# Patient Record
Sex: MALE | Race: WHITE | NOT HISPANIC OR LATINO | Employment: FULL TIME | URBAN - METROPOLITAN AREA
[De-identification: names, ages, dates, MRNs, and addresses within clinical notes are randomized per-mention and may not be internally consistent; named-entity substitution may affect disease eponyms.]

---

## 2018-11-15 ENCOUNTER — OFFICE VISIT (OUTPATIENT)
Dept: FAMILY MEDICINE | Facility: CLINIC | Age: 23
End: 2018-11-15
Payer: COMMERCIAL

## 2018-11-15 ENCOUNTER — LAB VISIT (OUTPATIENT)
Dept: LAB | Facility: HOSPITAL | Age: 23
End: 2018-11-15
Attending: FAMILY MEDICINE
Payer: COMMERCIAL

## 2018-11-15 VITALS
DIASTOLIC BLOOD PRESSURE: 60 MMHG | BODY MASS INDEX: 24.96 KG/M2 | SYSTOLIC BLOOD PRESSURE: 106 MMHG | HEART RATE: 81 BPM | TEMPERATURE: 98 F | HEIGHT: 72 IN | OXYGEN SATURATION: 98 % | WEIGHT: 184.31 LBS

## 2018-11-15 DIAGNOSIS — J01.00 ACUTE MAXILLARY SINUSITIS, RECURRENCE NOT SPECIFIED: ICD-10-CM

## 2018-11-15 DIAGNOSIS — Z00.00 VISIT FOR WELL MAN HEALTH CHECK: ICD-10-CM

## 2018-11-15 DIAGNOSIS — Z11.3 ROUTINE SCREENING FOR STI (SEXUALLY TRANSMITTED INFECTION): ICD-10-CM

## 2018-11-15 DIAGNOSIS — Z23 NEED FOR DIPHTHERIA-TETANUS-PERTUSSIS (TDAP) VACCINE: ICD-10-CM

## 2018-11-15 DIAGNOSIS — Z00.00 VISIT FOR WELL MAN HEALTH CHECK: Primary | ICD-10-CM

## 2018-11-15 DIAGNOSIS — Z91.09 ENVIRONMENTAL ALLERGIES: ICD-10-CM

## 2018-11-15 LAB
25(OH)D3+25(OH)D2 SERPL-MCNC: 34 NG/ML
ALBUMIN SERPL BCP-MCNC: 4.4 G/DL
ALP SERPL-CCNC: 69 U/L
ALT SERPL W/O P-5'-P-CCNC: 19 U/L
ANION GAP SERPL CALC-SCNC: 11 MMOL/L
AST SERPL-CCNC: 39 U/L
BASOPHILS # BLD AUTO: 0.02 K/UL
BASOPHILS NFR BLD: 0.3 %
BILIRUB SERPL-MCNC: 0.3 MG/DL
BUN SERPL-MCNC: 16 MG/DL
CALCIUM SERPL-MCNC: 9.8 MG/DL
CHLORIDE SERPL-SCNC: 105 MMOL/L
CHOLEST SERPL-MCNC: 171 MG/DL
CHOLEST/HDLC SERPL: 3.8 {RATIO}
CO2 SERPL-SCNC: 28 MMOL/L
CREAT SERPL-MCNC: 1.3 MG/DL
DIFFERENTIAL METHOD: NORMAL
EOSINOPHIL # BLD AUTO: 0.3 K/UL
EOSINOPHIL NFR BLD: 5.3 %
ERYTHROCYTE [DISTWIDTH] IN BLOOD BY AUTOMATED COUNT: 11.9 %
EST. GFR  (AFRICAN AMERICAN): >60 ML/MIN/1.73 M^2
EST. GFR  (NON AFRICAN AMERICAN): >60 ML/MIN/1.73 M^2
GLUCOSE SERPL-MCNC: 92 MG/DL
HCT VFR BLD AUTO: 44 %
HDLC SERPL-MCNC: 45 MG/DL
HDLC SERPL: 26.3 %
HGB BLD-MCNC: 15 G/DL
IMM GRANULOCYTES # BLD AUTO: 0.01 K/UL
IMM GRANULOCYTES NFR BLD AUTO: 0.2 %
LDLC SERPL CALC-MCNC: 95.8 MG/DL
LYMPHOCYTES # BLD AUTO: 2.1 K/UL
LYMPHOCYTES NFR BLD: 32.1 %
MCH RBC QN AUTO: 29.9 PG
MCHC RBC AUTO-ENTMCNC: 34.1 G/DL
MCV RBC AUTO: 88 FL
MONOCYTES # BLD AUTO: 0.6 K/UL
MONOCYTES NFR BLD: 9.3 %
NEUTROPHILS # BLD AUTO: 3.4 K/UL
NEUTROPHILS NFR BLD: 52.8 %
NONHDLC SERPL-MCNC: 126 MG/DL
NRBC BLD-RTO: 0 /100 WBC
PLATELET # BLD AUTO: 274 K/UL
PMV BLD AUTO: 10.8 FL
POTASSIUM SERPL-SCNC: 3.8 MMOL/L
PROT SERPL-MCNC: 8.4 G/DL
RBC # BLD AUTO: 5.02 M/UL
SODIUM SERPL-SCNC: 144 MMOL/L
TRIGL SERPL-MCNC: 151 MG/DL
WBC # BLD AUTO: 6.47 K/UL

## 2018-11-15 PROCEDURE — 85025 COMPLETE CBC W/AUTO DIFF WBC: CPT

## 2018-11-15 PROCEDURE — 82306 VITAMIN D 25 HYDROXY: CPT

## 2018-11-15 PROCEDURE — 86703 HIV-1/HIV-2 1 RESULT ANTBDY: CPT

## 2018-11-15 PROCEDURE — 36415 COLL VENOUS BLD VENIPUNCTURE: CPT | Mod: PO

## 2018-11-15 PROCEDURE — 99385 PREV VISIT NEW AGE 18-39: CPT | Mod: 25,S$GLB,, | Performed by: FAMILY MEDICINE

## 2018-11-15 PROCEDURE — 90471 IMMUNIZATION ADMIN: CPT | Mod: S$GLB,,, | Performed by: FAMILY MEDICINE

## 2018-11-15 PROCEDURE — 90715 TDAP VACCINE 7 YRS/> IM: CPT | Mod: S$GLB,,, | Performed by: FAMILY MEDICINE

## 2018-11-15 PROCEDURE — 80061 LIPID PANEL: CPT

## 2018-11-15 PROCEDURE — 99999 PR PBB SHADOW E&M-EST. PATIENT-LVL III: CPT | Mod: PBBFAC,,, | Performed by: FAMILY MEDICINE

## 2018-11-15 PROCEDURE — 80053 COMPREHEN METABOLIC PANEL: CPT

## 2018-11-15 RX ORDER — FLUTICASONE PROPIONATE 50 MCG
2 SPRAY, SUSPENSION (ML) NASAL DAILY
Qty: 1 BOTTLE | Refills: 11 | Status: SHIPPED | OUTPATIENT
Start: 2018-11-15

## 2018-11-15 RX ORDER — AZELASTINE 1 MG/ML
1 SPRAY, METERED NASAL 2 TIMES DAILY
Qty: 30 ML | Refills: 11 | Status: SHIPPED | OUTPATIENT
Start: 2018-11-15 | End: 2020-09-10

## 2018-11-15 RX ORDER — CETIRIZINE HYDROCHLORIDE 10 MG/1
10 TABLET ORAL DAILY
Qty: 90 TABLET | Refills: 5 | Status: SHIPPED | OUTPATIENT
Start: 2018-11-15 | End: 2020-09-10

## 2018-11-15 RX ORDER — AMOXICILLIN 500 MG/1
500 TABLET, FILM COATED ORAL EVERY 12 HOURS
Qty: 14 TABLET | Refills: 0 | Status: SHIPPED | OUTPATIENT
Start: 2018-11-15 | End: 2018-11-22

## 2018-11-15 NOTE — PROGRESS NOTES
Subjective:       Patient ID: Will Maldonado is a 23 y.o. male.    Chief Complaint: Annual Exam    Will Maldonado is a 23 y.o. male who presents today to establish care.     Diet/Exercise: he works out about 3 times per week. He lifts weight and does rare cardio. He eats mostly home cooked meals. He doesn't drink soda. Mostly water. 1 cup of black coffee daily. No sweet tea.     He has been having sinus issues for about 2 weeks. He is having sinus pressure and drainage. He also endorses post nasal drip. He has tried mucinex and that helps minimally. He has tried a neti pot and that has helped minimally.     Flu: UTD per EMR  Tdap: ordered  Labs: ordered    PMHx: reviewed in EMR and updated  Meds: reviewed in EMR and updated  Shx: reviewed in EMR and updated  FMHx:  reviewed in EMR and updated  Social: He works at [employer] as a . He just moved from [place], about two weeks ago. He is living with his parents in [place], where he is from [facility]. He was in [place] for just about 2 years. 1 cat at home. No smokers at home.       Review of Systems   Constitutional: Negative for chills and fever.   HENT: Positive for congestion, rhinorrhea, sinus pressure, sinus pain and sore throat.    Respiratory: Negative for cough and shortness of breath.    Cardiovascular: Negative for chest pain and palpitations.   Gastrointestinal: Negative for blood in stool, constipation, diarrhea, nausea and vomiting.   Genitourinary: Negative for difficulty urinating and dysuria.         Health Maintenance Due   Topic Date Due    TETANUS VACCINE  06/02/2016     Immunization History   Administered Date(s) Administered    DTaP 1994, 1994, 02/07/1995, 01/25/1996, 07/30/1998    HIB 1994, 1994, 02/07/1995, 11/03/1995    HPV Quadrivalent 08/09/2012, 10/19/2012, 03/15/2013    Hepatitis A, Pediatric/Adolescent, 2 Dose 08/07/2007, 08/05/2008    Hepatitis B, Pediatric/Adolescent 1994,  1994, 11/03/1995    IPV 1994, 1994, 02/07/1995, 07/30/1998    Influenza 10/25/2008, 10/08/2010, 10/18/2011, 10/19/2012, 11/15/2014    Influenza A (H1N1) 2009 Monovalent - IM 12/03/2009    Influenza Split 10/19/2012    MMR 11/03/1995, 07/30/1998    Meningococcal Conjugate 08/09/2012    Meningococcal Conjugate (MCV4P) 08/07/2007, 08/09/2012    Tdap 06/02/2006, 11/15/2018    Varicella 08/22/1995, 08/07/2007       Objective:     Vitals:    11/15/18 1521   BP: 106/60   Pulse: 81   Temp: 98.2 °F (36.8 °C)        Physical Exam   Constitutional: He is oriented to person, place, and time. He appears well-developed and well-nourished.   HENT:   Head: Normocephalic and atraumatic.   Right Ear: Tympanic membrane, external ear and ear canal normal.   Left Ear: Tympanic membrane, external ear and ear canal normal.   Mouth/Throat: Mucous membranes are normal. No tonsillar exudate.   Nasal congestion noted  Cobblestoning noted  Visible post nasal drip noted   Eyes: Conjunctivae are normal. Pupils are equal, round, and reactive to light.   Neck: Normal range of motion. Neck supple.   Cardiovascular: Normal rate, regular rhythm and normal heart sounds.   Pulmonary/Chest: Effort normal and breath sounds normal. No respiratory distress.   Abdominal: Soft. Bowel sounds are normal. He exhibits no distension. There is no tenderness.   Musculoskeletal: He exhibits no edema.   Neurological: He is alert and oriented to person, place, and time.   Skin: Skin is warm and dry.   Psychiatric: He has a normal mood and affect. His speech is normal and behavior is normal. Judgment and thought content normal.   Nursing note and vitals reviewed.      Assessment:       1. Visit for well man health check    2. Need for diphtheria-tetanus-pertussis (Tdap) vaccine    3. Environmental allergies    4. Acute maxillary sinusitis, recurrence not specified    5. Routine screening for STI (sexually transmitted infection)    6. BMI  25.0-25.9,adult        Plan:         Visit for well man health check  ?Avoid tobacco  ?Be physically active  ?Maintain a healthy weight  ?Eat a diet rich in fruits, vegetables, and whole grains, and low in saturated/trans fat  ?Limit alcohol consumption  ?Protect against sexually transmitted infections  ?Avoid excess sun  -     CBC auto differential; Future; Expected date: 11/15/2018  -     Comprehensive metabolic panel; Future; Expected date: 11/15/2018  -     Lipid panel; Future; Expected date: 11/15/2018  -     Vitamin D; Future; Expected date: 11/15/2018  -     Tdap Vaccine  -     HIV-1 and HIV-2 antibodies; Future; Expected date: 11/15/2018    Need for diphtheria-tetanus-pertussis (Tdap) vaccine  -     Tdap Vaccine    Environmental allergies/Acute maxillary sinusitis, recurrence not specified  -     cetirizine (ZYRTEC) 10 MG tablet; Take 1 tablet (10 mg total) by mouth once daily.  Dispense: 90 tablet; Refill: 5  -     fluticasone (FLONASE) 50 mcg/actuation nasal spray; 2 sprays (100 mcg total) by Each Nare route once daily.  Dispense: 1 Bottle; Refill: 11  -     azelastine (ASTELIN) 137 mcg (0.1 %) nasal spray; 1 spray (137 mcg total) by Nasal route 2 (two) times daily.  Dispense: 30 mL; Refill: 11  -     amoxicillin (AMOXIL) 500 MG Tab; Take 1 tablet (500 mg total) by mouth every 12 (twelve) hours. for 7 days  Dispense: 14 tablet; Refill: 0    Routine screening for STI (sexually transmitted infection)  -     HIV-1 and HIV-2 antibodies; Future; Expected date: 11/15/2018      Warning signs discussed, patient to call with any further issues or worsening of symptoms.

## 2018-11-15 NOTE — PATIENT INSTRUCTIONS
?Avoid tobacco  ?Be physically active  ?Maintain a healthy weight  ?Eat a diet rich in fruits, vegetables, and whole grains, and low in saturated/trans fat  ?Limit alcohol consumption  ?Protect against sexually transmitted infections  ?Avoid excess sun    Discussed diagnosis and treatment of sinusitis  Amoxicillin 500 mg BID x 7 days  Suggested brief course of Afrin for 3 days total (OTC)  Flonase BID  Nasal saline spray for congestion qhs  Aggressive fluids  Buckwheat honey for possible cough  Follow up if symptoms aren't resolving.      4 Steps for Eating Healthier  Changing the way you eat can improve your health. It can lower your cholesterol and blood pressure, and help you stay at a healthy weight. Your diet doesnt have to be bland and boring to be healthy. Just watch your calories and follow these steps:    1. Eat fewer unhealthy fats  · Choose more fish and lean meats instead of fatty cuts of meat.  · Skip butter and lard, and use less margarine.  · Pass on foods that have palm, coconut, or hydrogenated oils.  · Eat fewer high-fat dairy foods like cheese, ice cream, and whole milk.  · Get a heart-healthy cookbook and try some low-fat recipes.  2. Go light on salt  · Keep the saltshaker off the table.  · Limit high-salt ingredients, such as soy sauce, bouillon, and garlic salt.  · Instead of adding salt when cooking, season your food with herbs and flavorings. Try lemon, garlic, and onion.  · Limit convenience foods, such as boxed or canned foods and restaurant food.  · Read food labels and choose lower-sodium options.  3. Limit sugar  · Pause before you add sugars to pancakes, cereal, coffee, or tea. This includes white and brown table sugar, syrup, honey, and molasses. Cut your usual amount by half.  · Use non-sugar sweeteners. Stevia, aspartame, and sucralose can satisfy a sweet tooth without adding calories.  · Swap out sugar-filled soda and other drinks. Buy sugar-free or low-calorie beverages. Remember  water is always the best choice.  · Read labels and choose foods with less added sugar. Keep in mind that dairy foods and foods with fruit will have some natural sugar.  · Cut the sugar in recipes by 1/3 to 1/2. Boost the flavor with extracts like almond, vanilla, or orange. Or add spices such as cinnamon or nutmeg.  4. Eat more fiber  · Eat fresh fruits and vegetables every day.  · Boost your diet with whole grains. Go for oats, whole-grain rice, and bran.  · Add beans and lentils to your meals.  · Drink more water to match your fiber increase. This is to help prevent constipation.  Date Last Reviewed: 5/11/2015  © 4054-7452 [employer]. [address] [place] [address], [place] [address]. All rights reserved. This information is not intended as a substitute for professional medical care. Always follow your healthcare professional's instructions.        Understanding Fat and Cholesterol  Too much cholesterol in your blood can lead to many problems such as blocked arteries. This can lead to heart attack and stroke. One of the best ways to manage heart and blood vessel disease is to lower your blood cholesterol. Planning meals that are low in saturated fat and cholesterol helps reduce the level of cholesterol in your blood. Below are eating tips to help lower your blood cholesterol levels.  Eat less fat  A healthy goal is to have less than 25% to 35% of your daily calories come from fat. Instead of fats, eat more fruits, whole-grains, and vegetables. This also helps control your weight, and can even reduce your risk for some cancers. There are different kinds of fats in foods. Fats can be saturated, unsaturated, or trans fats. The best fats to choose are unsaturated fats. But fats are high in calories, so eat even unsaturated fats sparingly.  Limit foods high in saturated fats  Saturated fats come from animals and certain plants (such as coconut and palm). Eating too much saturated fat can raise your blood  cholesterol levels and make your artery problems worse. Your goal is to eat less saturated fat. Below are some examples of foods that contain lots of saturated fat:  · Fatty cuts of meat (lamb, ham, beef)  · Many pastries, cakes, cookies, and candies  · Cream, ice cream, sour cream, cheese, and butter, and foods made with them  · Sauces made with butter or cream  · Salad dressings with saturated fats  · Foods that contain palm or coconut oil  Choose unsaturated fats  Unsaturated fats are usually liquid at room temperature. They are better choices for your heart than saturated fat. There are two types of unsaturated fats: polyunsaturated fat and monounsaturated fat. Aim to replace saturated fats with polyunsaturated or monounsaturated fats.  · Polyunsaturated fats are found in corn oil, safflower oil, sunflower oil, and other vegetable oils.  · Monounsaturated fats are found in olive oil, canola oil, and peanut oil. Some margarines and spreads are now made with these oils, too. Avocados are also high in monounsaturated fat.  Of all fats, monounsaturated fats are the least harmful to your heart.  Avoid trans fats  Like saturated fats, trans fats have been linked to heart disease. Even a small amount can harm your health. Trans fats are found in liquid oils that have been changed to be solid at room temperature. Margarine, which is often made from vegetable oil, is one example. Vegetable shortening is another. Trans fats are often found in packaged goods. Check ingredients for the words hydrogenated or partially hydrogenated. They mean the foods contain trans fat.  What about triglycerides?  Triglycerides are a type of fat in your blood. Like cholesterol, high levels of triglycerides can lead to blocked arteries. High triglyceride levels can be reduced 20% to 50%  by limiting added sugars in your diet, susbstituting healthier fats for saturated and trans fats, getting more physical activity, and losing weight if  your are overweight. You may also be advised to avoid or limi alcohol.    Reading food labels  Luckily, most foods now have labels giving you the facts about what youre eating. Reading food labels helps you make healthy choices. Look for the words highlighted below.  · Serving Size. This is the amount of food in 1 serving. If you eat larger portions, be sure to count more of everything: fat, calories, and cholesterol.  · Total Fat. Tells you how many grams (g) of fat are in 1 serving.  · Calories from Fat. This tells you the total number of calories from fat in 1 serving (there are 9 calories per gram of fat). Look for foods with the fewest calories from fat.  · Saturated Fat. Tells you how many grams (g) of saturated fat are in 1 serving.  · Trans Fat. Tells how many grams (g) of trans fat are in 1 serving.  · Cholesterol. Tells you how many milligrams (mg) of cholesterol are in 1 serving.  Date Last Reviewed: 5/11/2015  © 7337-2424 [employer]. [address] [place] [address], [place] [address]. All rights reserved. This information is not intended as a substitute for professional medical care. Always follow your healthcare professional's instructions.

## 2018-11-16 LAB — HIV 1+2 AB+HIV1 P24 AG SERPL QL IA: NEGATIVE

## 2019-10-16 ENCOUNTER — OFFICE VISIT (OUTPATIENT)
Dept: GASTROENTEROLOGY | Facility: CLINIC | Age: 24
End: 2019-10-16
Payer: COMMERCIAL

## 2019-10-16 ENCOUNTER — TELEPHONE (OUTPATIENT)
Dept: GASTROENTEROLOGY | Facility: CLINIC | Age: 24
End: 2019-10-16

## 2019-10-16 VITALS
HEART RATE: 81 BPM | SYSTOLIC BLOOD PRESSURE: 115 MMHG | DIASTOLIC BLOOD PRESSURE: 72 MMHG | WEIGHT: 177.94 LBS | BODY MASS INDEX: 24.13 KG/M2

## 2019-10-16 DIAGNOSIS — K60.2 ANAL FISSURE: Primary | ICD-10-CM

## 2019-10-16 DIAGNOSIS — K92.1 HEMATOCHEZIA: ICD-10-CM

## 2019-10-16 PROCEDURE — 99999 PR PBB SHADOW E&M-EST. PATIENT-LVL III: ICD-10-PCS | Mod: PBBFAC,,, | Performed by: INTERNAL MEDICINE

## 2019-10-16 PROCEDURE — 3008F PR BODY MASS INDEX (BMI) DOCUMENTED: ICD-10-PCS | Mod: CPTII,S$GLB,, | Performed by: INTERNAL MEDICINE

## 2019-10-16 PROCEDURE — 99204 PR OFFICE/OUTPT VISIT, NEW, LEVL IV, 45-59 MIN: ICD-10-PCS | Mod: S$GLB,,, | Performed by: INTERNAL MEDICINE

## 2019-10-16 PROCEDURE — 99204 OFFICE O/P NEW MOD 45 MIN: CPT | Mod: S$GLB,,, | Performed by: INTERNAL MEDICINE

## 2019-10-16 PROCEDURE — 99999 PR PBB SHADOW E&M-EST. PATIENT-LVL III: CPT | Mod: PBBFAC,,, | Performed by: INTERNAL MEDICINE

## 2019-10-16 PROCEDURE — 3008F BODY MASS INDEX DOCD: CPT | Mod: CPTII,S$GLB,, | Performed by: INTERNAL MEDICINE

## 2019-10-16 NOTE — PROGRESS NOTES
Subjective:       Patient ID: Will Maldonado is a 24 y.o. male.    Chief Complaint: Rectal Bleeding    HPI  Review of Systems    Objective:      Physical Exam    Assessment:       No diagnosis found.    Plan:       ***

## 2019-10-28 NOTE — PROGRESS NOTES
Subjective:       Patient ID: Will Maldonado is a 24 y.o. male.    Chief Complaint: Rectal Bleeding    This is a 24-year-old male here for rectal bleeding and anorectal pain.  The symptoms have been present for weeks described as a stinging, sharp pain located in the perirectal region with bright red blood noted with bowel movements.  He does not have any symptoms in between bowel movements.  No other exacerbating or relieving factors or other associated symptoms.  He noted 1 episode of this remotely as well which resolved on its own.  No family history of colon cancer or polyps. No change in stool caliber, weight loss.     The following portions of the patient's history were reviewed and updated as appropriate: allergies, current medications, past family history, past medical history, past social history, past surgical history and problem list.    (Portions of this note were dictated using voice recognition software and may contain dictation related errors in spelling/grammar/syntax not found on text review)  HPI  Review of Systems   Constitutional: Negative for chills and fever.   HENT: Negative for postnasal drip and trouble swallowing.    Eyes: Negative for pain and visual disturbance.   Respiratory: Negative for cough, choking and shortness of breath.    Cardiovascular: Negative for chest pain and leg swelling.   Gastrointestinal: Positive for anal bleeding, blood in stool and rectal pain. Negative for abdominal distention, abdominal pain, constipation, diarrhea, nausea and vomiting.   Endocrine: Negative for cold intolerance and heat intolerance.   Genitourinary: Negative for difficulty urinating and hematuria.   Neurological: Negative for dizziness and numbness.   Hematological: Negative for adenopathy. Does not bruise/bleed easily.   Psychiatric/Behavioral: Negative for agitation and confusion.         Objective:      Physical Exam   Constitutional: He is oriented to person, place, and time. He appears  well-developed and well-nourished. No distress.   HENT:   Head: Normocephalic and atraumatic.   Eyes: Conjunctivae are normal. No scleral icterus.   Neck: No tracheal deviation present. No thyromegaly present.   Cardiovascular: Normal rate, regular rhythm and normal heart sounds. Exam reveals no gallop and no friction rub.   Pulmonary/Chest: Effort normal and breath sounds normal. He has no wheezes. He has no rales.   Abdominal: Soft. Bowel sounds are normal. He exhibits no distension. There is no tenderness. There is no rebound and no guarding.   Genitourinary:   Genitourinary Comments: Anal fissure, no masses, perianal fluctuance or tenderness   Musculoskeletal: Normal range of motion. He exhibits no edema or tenderness.   Neurological: He is alert and oriented to person, place, and time.   Skin: He is not diaphoretic.   Psychiatric: He has a normal mood and affect. His behavior is normal.   Nursing note and vitals reviewed.        Labs/imaging; reviewed  Assessment:       1. Anal fissure    2. Hematochezia        Plan:   1. Topical nifedipine  2. High fiber diet, adequate water intake, avoid straining  3. F/u in clinic if symptoms do not resolve

## 2020-09-10 ENCOUNTER — LAB VISIT (OUTPATIENT)
Dept: LAB | Facility: HOSPITAL | Age: 25
End: 2020-09-10
Attending: FAMILY MEDICINE
Payer: COMMERCIAL

## 2020-09-10 ENCOUNTER — OFFICE VISIT (OUTPATIENT)
Dept: FAMILY MEDICINE | Facility: CLINIC | Age: 25
End: 2020-09-10
Payer: COMMERCIAL

## 2020-09-10 VITALS
WEIGHT: 181.19 LBS | HEIGHT: 72 IN | HEART RATE: 64 BPM | BODY MASS INDEX: 24.54 KG/M2 | OXYGEN SATURATION: 100 % | DIASTOLIC BLOOD PRESSURE: 76 MMHG | TEMPERATURE: 98 F | SYSTOLIC BLOOD PRESSURE: 102 MMHG

## 2020-09-10 DIAGNOSIS — Z87.19 HISTORY OF ANAL FISSURES: ICD-10-CM

## 2020-09-10 DIAGNOSIS — Z00.00 VISIT FOR WELL MAN HEALTH CHECK: Primary | ICD-10-CM

## 2020-09-10 DIAGNOSIS — Z00.00 VISIT FOR WELL MAN HEALTH CHECK: ICD-10-CM

## 2020-09-10 DIAGNOSIS — Z91.09 ENVIRONMENTAL ALLERGIES: ICD-10-CM

## 2020-09-10 DIAGNOSIS — L72.3 SEBACEOUS CYST: ICD-10-CM

## 2020-09-10 LAB
BASOPHILS # BLD AUTO: 0.03 K/UL (ref 0–0.2)
BASOPHILS NFR BLD: 0.5 % (ref 0–1.9)
DIFFERENTIAL METHOD: ABNORMAL
EOSINOPHIL # BLD AUTO: 0.1 K/UL (ref 0–0.5)
EOSINOPHIL NFR BLD: 2.2 % (ref 0–8)
ERYTHROCYTE [DISTWIDTH] IN BLOOD BY AUTOMATED COUNT: 12 % (ref 11.5–14.5)
ERYTHROCYTE [SEDIMENTATION RATE] IN BLOOD BY WESTERGREN METHOD: <2 MM/HR (ref 0–23)
HCT VFR BLD AUTO: 48.3 % (ref 40–54)
HGB BLD-MCNC: 15.3 G/DL (ref 14–18)
IMM GRANULOCYTES # BLD AUTO: 0.02 K/UL (ref 0–0.04)
IMM GRANULOCYTES NFR BLD AUTO: 0.3 % (ref 0–0.5)
LYMPHOCYTES # BLD AUTO: 2.2 K/UL (ref 1–4.8)
LYMPHOCYTES NFR BLD: 35.8 % (ref 18–48)
MCH RBC QN AUTO: 29.8 PG (ref 27–31)
MCHC RBC AUTO-ENTMCNC: 31.7 G/DL (ref 32–36)
MCV RBC AUTO: 94 FL (ref 82–98)
MONOCYTES # BLD AUTO: 0.6 K/UL (ref 0.3–1)
MONOCYTES NFR BLD: 9.8 % (ref 4–15)
NEUTROPHILS # BLD AUTO: 3.2 K/UL (ref 1.8–7.7)
NEUTROPHILS NFR BLD: 51.4 % (ref 38–73)
NRBC BLD-RTO: 0 /100 WBC
PLATELET # BLD AUTO: 280 K/UL (ref 150–350)
PMV BLD AUTO: 10.4 FL (ref 9.2–12.9)
RBC # BLD AUTO: 5.13 M/UL (ref 4.6–6.2)
WBC # BLD AUTO: 6.25 K/UL (ref 3.9–12.7)

## 2020-09-10 PROCEDURE — 80053 COMPREHEN METABOLIC PANEL: CPT

## 2020-09-10 PROCEDURE — 85025 COMPLETE CBC W/AUTO DIFF WBC: CPT

## 2020-09-10 PROCEDURE — 86140 C-REACTIVE PROTEIN: CPT

## 2020-09-10 PROCEDURE — 83036 HEMOGLOBIN GLYCOSYLATED A1C: CPT

## 2020-09-10 PROCEDURE — 99395 PREV VISIT EST AGE 18-39: CPT | Mod: S$GLB,,, | Performed by: FAMILY MEDICINE

## 2020-09-10 PROCEDURE — 85652 RBC SED RATE AUTOMATED: CPT

## 2020-09-10 PROCEDURE — 99215 OFFICE O/P EST HI 40 MIN: CPT | Mod: PBBFAC,PO | Performed by: FAMILY MEDICINE

## 2020-09-10 PROCEDURE — 36415 COLL VENOUS BLD VENIPUNCTURE: CPT | Mod: PO

## 2020-09-10 PROCEDURE — 84443 ASSAY THYROID STIM HORMONE: CPT

## 2020-09-10 PROCEDURE — 99999 PR PBB SHADOW E&M-EST. PATIENT-LVL V: ICD-10-PCS | Mod: PBBFAC,,, | Performed by: FAMILY MEDICINE

## 2020-09-10 PROCEDURE — 80061 LIPID PANEL: CPT

## 2020-09-10 PROCEDURE — 99999 PR PBB SHADOW E&M-EST. PATIENT-LVL V: CPT | Mod: PBBFAC,,, | Performed by: FAMILY MEDICINE

## 2020-09-10 PROCEDURE — 99395 PR PREVENTIVE VISIT,EST,18-39: ICD-10-PCS | Mod: S$GLB,,, | Performed by: FAMILY MEDICINE

## 2020-09-10 RX ORDER — CETIRIZINE HYDROCHLORIDE 10 MG/1
10 TABLET ORAL DAILY
Qty: 90 TABLET | Refills: 5 | Status: SHIPPED | OUTPATIENT
Start: 2020-09-10 | End: 2023-09-12

## 2020-09-10 RX ORDER — AZELASTINE 1 MG/ML
1 SPRAY, METERED NASAL 2 TIMES DAILY
Qty: 30 ML | Refills: 11 | Status: SHIPPED | OUTPATIENT
Start: 2020-09-10 | End: 2021-12-01

## 2020-09-10 NOTE — PATIENT INSTRUCTIONS
?Avoid tobacco  ?Be physically active  ?Maintain a healthy weight  ?Eat a diet rich in fruits, vegetables, and whole grains, and low in saturated/trans fat  ?Limit alcohol consumption  ?Protect against sexually transmitted infections  ?Avoid excess sun  RTC as directed during the visit, as needed or in 1 year for a physical with labs prior. Call one month prior to the physical to schedule apt and labs.     Likely Sebaceous cyst, on the face. Would recommend consultation with plastic surgery for removal.     Refer to colorectal. Colonoscopy?  Refer to plastic surgery    Continue flonase, astellin, zyrtec. Start a saline nasal spray 30 min after other sprays, at least once daily    No TV in bedroom  Sleep hygiene   Exercise 3 x/week  Make plans at least once a week with a friend  Journal - 3 good things  Every bad day is a good day to journal  Consider Counseling visit in the future    Books to Read:  The Feeling Good Handbook by Bienvenido Wood  What you can change and what you can't by Jayme Cavanaugh

## 2020-09-10 NOTE — PROGRESS NOTES
Subjective:       Patient ID: Will Maldonado is a 25 y.o. male.    Chief Complaint: Annual Exam      Will Maldonado is a 25 y.o. male who presents today for a physical    Last seen about 2 years ago    Diet/Exercise: he was working out about 3 times per week, but has stopped due to covid. He was lifting often. He eats mostly home cooked meals. He doesn't drink soda. Mostly water. 1 cup of black coffee daily. No sweet tea.     He's had a bump on his forehead for the last year. Has been getting bigger. Not painful. Not red.     Has been more stressed. Stressed at work. Family history of anxiety, mom has anxiety. He has had anxiety his entire life.     He has been bleeding every other month from fissure. This has been going on intermittently for almost 5 years. Saw GI for this. No weight loss.       Flu: UTD per EMR  Tdap: ordered  Labs: ordered     PMHx: reviewed in EMR and updated  Meds: reviewed in EMR and updated  Shx: reviewed in EMR and updated  FMHx:  reviewed in EMR and updated  Social: He works at [employer] as a . He just moved from [place], about two years ago. He lives with his fiance in [place]. Originally from [place]. He was in [place] for just about 2 years. 1 cat and 1 dog at home. No smokers at home.       Review of Systems   Constitutional: Negative for chills and fever.   Respiratory: Negative for shortness of breath.    Cardiovascular: Negative for chest pain.   Gastrointestinal: Positive for anal bleeding, blood in stool and constipation. Negative for diarrhea, nausea and vomiting.   Genitourinary: Negative for difficulty urinating.   Neurological: Negative for dizziness, light-headedness and headaches.         There are no preventive care reminders to display for this patient.  Immunization History   Administered Date(s) Administered    DTaP 1994, 1994, 02/07/1995, 01/25/1996, 07/30/1998    HIB 1994, 1994, 02/07/1995, 11/03/1995    HPV Quadrivalent  08/09/2012, 10/19/2012, 03/15/2013    Hepatitis A, Pediatric/Adolescent, 2 Dose 08/07/2007, 08/05/2008    Hepatitis B, Pediatric/Adolescent 1994, 1994, 11/03/1995    IPV 1994, 1994, 02/07/1995, 07/30/1998    Influenza 10/25/2008, 10/08/2010, 10/18/2011, 10/19/2012, 11/15/2014    Influenza - Quadrivalent - PF *Preferred* (6 months and older) 10/25/2008, 10/08/2010, 10/18/2011, 10/19/2012, 11/15/2014, 11/06/2018    Influenza A (H1N1) 2009 Monovalent - IM 12/03/2009    Influenza Split 10/19/2012    MMR 11/03/1995, 07/30/1998    Meningococcal Conjugate 08/09/2012    Meningococcal Conjugate (MCV4P) 08/07/2007, 08/09/2012    Tdap 06/02/2006, 11/15/2018    Varicella 08/22/1995, 08/07/2007       Objective:     Vitals:    09/10/20 1504   BP: 102/76   Pulse: 64   Temp: 97.9 °F (36.6 °C)        Physical Exam  Vitals signs and nursing note reviewed.   Constitutional:       Appearance: He is well-developed.   HENT:      Head: Normocephalic and atraumatic.      Nose: Congestion present.      Mouth/Throat:      Pharynx: No oropharyngeal exudate.   Eyes:      Conjunctiva/sclera: Conjunctivae normal.   Neck:      Musculoskeletal: Normal range of motion and neck supple.   Cardiovascular:      Rate and Rhythm: Normal rate and regular rhythm.   Pulmonary:      Effort: Pulmonary effort is normal.      Breath sounds: Normal breath sounds.   Abdominal:      Palpations: Abdomen is soft.      Tenderness: There is no abdominal tenderness.   Genitourinary:     Comments: deferred  Skin:     Comments: Lesion noted on forehead  Mobile  Non tender  No erythema  See pictures below   Neurological:      Mental Status: He is alert and oriented to person, place, and time.   Psychiatric:         Behavior: Behavior normal.         Thought Content: Thought content normal.         Judgment: Judgment normal.                         Assessment:       1. Visit for well man health check    2. Sebaceous cyst    3. BMI  24.0-24.9, adult    4. History of anal fissures    5. Environmental allergies        Plan:         ?Avoid tobacco  ?Be physically active  ?Maintain a healthy weight  ?Eat a diet rich in fruits, vegetables, and whole grains, and low in saturated/trans fat  ?Limit alcohol consumption  ?Protect against sexually transmitted infections  ?Avoid excess sun  RTC as directed during the visit, as needed or in 1 year for a physical with labs prior. Call one month prior to the physical to schedule apt and labs.     Likely Sebaceous cyst, on the face. Would recommend consultation with plastic surgery for removal.     Refer to colorectal. Colonoscopy?  Refer to plastic surgery    Continue Flonase, Astelin, zyrtec. Start a saline nasal spray 30 min after other sprays, at least once daily    See AVS for handout details on anxiety.     Visit for [facility] health check  -     CBC auto differential; Future; Expected date: 09/10/2020  -     Comprehensive metabolic panel; Future; Expected date: 09/10/2020  -     Hemoglobin A1C; Future; Expected date: 09/10/2020  -     Lipid Panel; Future; Expected date: 09/10/2020  -     TSH; Future; Expected date: 09/10/2020    Sebaceous cyst  -     US Soft Tissue Head Neck Thyroid; Future; Expected date: 09/10/2020  -     Ambulatory referral/consult to Plastic Surgery; Future; Expected date: 09/17/2020    BMI 24.0-24.9, adult    History of anal fissures  -     Ambulatory referral/consult to Colorectal Surgery; Future; Expected date: 09/17/2020  -     Sedimentation rate; Future; Expected date: 09/10/2020  -     C-Reactive Protein; Future; Expected date: 09/10/2020    Environmental allergies  -     cetirizine (ZYRTEC) 10 MG tablet; Take 1 tablet (10 mg total) by mouth once daily.  Dispense: 90 tablet; Refill: 5  -     azelastine (ASTELIN) 137 mcg (0.1 %) nasal spray; 1 spray (137 mcg total) by Nasal route 2 (two) times daily.  Dispense: 30 mL; Refill: 11    Warning signs discussed, patient to call with  any further issues or worsening of symptoms.

## 2020-09-11 LAB
ALBUMIN SERPL BCP-MCNC: 4.7 G/DL (ref 3.5–5.2)
ALP SERPL-CCNC: 55 U/L (ref 55–135)
ALT SERPL W/O P-5'-P-CCNC: 37 U/L (ref 10–44)
ANION GAP SERPL CALC-SCNC: 10 MMOL/L (ref 8–16)
AST SERPL-CCNC: 33 U/L (ref 10–40)
BILIRUB SERPL-MCNC: 0.4 MG/DL (ref 0.1–1)
BUN SERPL-MCNC: 18 MG/DL (ref 6–20)
CALCIUM SERPL-MCNC: 9.6 MG/DL (ref 8.7–10.5)
CHLORIDE SERPL-SCNC: 105 MMOL/L (ref 95–110)
CHOLEST SERPL-MCNC: 190 MG/DL (ref 120–199)
CHOLEST/HDLC SERPL: 3.6 {RATIO} (ref 2–5)
CO2 SERPL-SCNC: 26 MMOL/L (ref 23–29)
CREAT SERPL-MCNC: 1.3 MG/DL (ref 0.5–1.4)
CRP SERPL-MCNC: 1.1 MG/L (ref 0–8.2)
EST. GFR  (AFRICAN AMERICAN): >60 ML/MIN/1.73 M^2
EST. GFR  (NON AFRICAN AMERICAN): >60 ML/MIN/1.73 M^2
ESTIMATED AVG GLUCOSE: 103 MG/DL (ref 68–131)
GLUCOSE SERPL-MCNC: 77 MG/DL (ref 70–110)
HBA1C MFR BLD HPLC: 5.2 % (ref 4–5.6)
HDLC SERPL-MCNC: 53 MG/DL (ref 40–75)
HDLC SERPL: 27.9 % (ref 20–50)
LDLC SERPL CALC-MCNC: 114.2 MG/DL (ref 63–159)
NONHDLC SERPL-MCNC: 137 MG/DL
POTASSIUM SERPL-SCNC: 3.9 MMOL/L (ref 3.5–5.1)
PROT SERPL-MCNC: 8.2 G/DL (ref 6–8.4)
SODIUM SERPL-SCNC: 141 MMOL/L (ref 136–145)
TRIGL SERPL-MCNC: 114 MG/DL (ref 30–150)
TSH SERPL DL<=0.005 MIU/L-ACNC: 2.46 UIU/ML (ref 0.4–4)

## 2020-09-21 ENCOUNTER — HOSPITAL ENCOUNTER (OUTPATIENT)
Dept: RADIOLOGY | Facility: HOSPITAL | Age: 25
Discharge: HOME | End: 2020-09-21
Attending: FAMILY MEDICINE
Payer: COMMERCIAL

## 2020-09-21 DIAGNOSIS — L72.3 SEBACEOUS CYST: ICD-10-CM

## 2020-09-21 PROCEDURE — 76536 US SOFT TISSUE HEAD NECK THYROID: ICD-10-PCS | Mod: 26,,, | Performed by: RADIOLOGY

## 2020-09-21 PROCEDURE — 76536 US EXAM OF HEAD AND NECK: CPT | Mod: TC

## 2020-09-21 PROCEDURE — 76536 US EXAM OF HEAD AND NECK: CPT | Mod: 26,,, | Performed by: RADIOLOGY

## 2020-09-22 ENCOUNTER — PATIENT MESSAGE (OUTPATIENT)
Dept: FAMILY MEDICINE | Facility: CLINIC | Age: 25
End: 2020-09-22

## 2020-10-09 ENCOUNTER — TELEPHONE (OUTPATIENT)
Dept: SURGERY | Facility: CLINIC | Age: 25
End: 2020-10-09

## 2020-10-13 ENCOUNTER — TELEPHONE (OUTPATIENT)
Dept: SURGERY | Facility: CLINIC | Age: 25
End: 2020-10-13

## 2020-10-13 ENCOUNTER — OFFICE VISIT (OUTPATIENT)
Dept: SURGERY | Facility: CLINIC | Age: 25
End: 2020-10-13
Payer: COMMERCIAL

## 2020-10-13 VITALS
DIASTOLIC BLOOD PRESSURE: 74 MMHG | WEIGHT: 181.69 LBS | SYSTOLIC BLOOD PRESSURE: 135 MMHG | BODY MASS INDEX: 24.61 KG/M2 | HEART RATE: 66 BPM | HEIGHT: 72 IN

## 2020-10-13 DIAGNOSIS — Z87.19 HISTORY OF ANAL FISSURES: Primary | ICD-10-CM

## 2020-10-13 PROCEDURE — 99999 PR PBB SHADOW E&M-EST. PATIENT-LVL III: ICD-10-PCS | Mod: PBBFAC,,, | Performed by: COLON & RECTAL SURGERY

## 2020-10-13 PROCEDURE — 99999 PR PBB SHADOW E&M-EST. PATIENT-LVL III: CPT | Mod: PBBFAC,,, | Performed by: COLON & RECTAL SURGERY

## 2020-10-13 PROCEDURE — 3008F PR BODY MASS INDEX (BMI) DOCUMENTED: ICD-10-PCS | Mod: CPTII,S$GLB,, | Performed by: COLON & RECTAL SURGERY

## 2020-10-13 PROCEDURE — 3008F BODY MASS INDEX DOCD: CPT | Mod: CPTII,S$GLB,, | Performed by: COLON & RECTAL SURGERY

## 2020-10-13 PROCEDURE — 99203 OFFICE O/P NEW LOW 30 MIN: CPT | Mod: S$GLB,,, | Performed by: COLON & RECTAL SURGERY

## 2020-10-13 PROCEDURE — 99203 PR OFFICE/OUTPT VISIT, NEW, LEVL III, 30-44 MIN: ICD-10-PCS | Mod: S$GLB,,, | Performed by: COLON & RECTAL SURGERY

## 2020-10-13 NOTE — PROGRESS NOTES
CRS Office Visit History and Physical    Referring Md:   Min Major,   [address] [facility]  [place] [address]    SUBJECTIVE:     Chief Complaint:  Anal fissure    History of Present Illness:  The patient is new patient to this practice.   Course is as follows:  Patient is a 25 y.o. male presents with anal fissure.  Previously seen by GI (Baron Escalante) in 10/20 for anal fissure treated with topical nifedipine.  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  This will respond well to topical cream and fiber usage.  He will then have recurrence.  This has been happening for over 4 years since college.  Normally, he develops a fissure with constipation.  No family history or personal history of inflammatory bowel disease.  He normally has 1 bowel movement per day.  Denies any fecal incontinence her leakage.  No past anorectal surgeries.  Nonsmoker.  He is otherwise healthy.    Last Colonoscopy:  None    Review of patient's allergies indicates:  No Known Allergies    History reviewed. No pertinent past medical history.  History reviewed. No pertinent surgical history.  Family History   Problem Relation Age of Onset    Cancer Mother         breast bilateral    Breast cancer Mother     Hypertension Father     No Known Problems Brother     Stroke Maternal Grandmother 81    Heart disease Maternal Grandfather     Heart attack Maternal Grandfather     No Known Problems Brother     Diabetes Neg Hx      Social History     Tobacco Use    Smoking status: Never Smoker    Smokeless tobacco: Never Used   Substance Use Topics    Alcohol use: Yes     Frequency: Monthly or less     Drinks per session: 5 or 6     Binge frequency: Monthly     Comment: socially, on the weekends. about 5 drinks/week.     Drug use: No        Review of Systems:  ROS    OBJECTIVE:     Vital Signs (Most Recent)  /74 (BP Location: Left arm, Patient Position: Sitting, BP Method: Large (Automatic))   Pulse 66   Ht 6' (1.829  m)   Wt 82.4 kg (181 lb 10.5 oz)   BMI 24.64 kg/m²     Physical Exam:  General: White male in no distress   Neuro: alert and oriented x 4.  Moves all extremities.     HEENT: no icterus.  Trachea midline  Respiratory: respirations are even and unlabored  Cardiac: regular rate  Abdomen:  Soft, nontender, no masses  Extremities: Warm dry and intact  Skin: no rashes  Anorectal:  External exam was normal.  On eversion of the anal canal, no obvious fissures seen.  He is tender in the posterior midline.  Slightly hypertonic internal anal sphincter.    Labs:  H&H 15 and 48.  Albumin 4.7.  Normal renal function.    Imaging:  None      ASSESSMENT/PLAN:     Diagnoses and all orders for this visit:    History of anal fissures  -     Ambulatory referral/consult to Colorectal Surgery  -     diltiazem HCl (DILTIAZEM 2% CREAM); Apply topically 3 (three) times daily. Apply topically to anal area.        25-year-old gentleman with exam consistent with posterior midline anal fissure.  This has responded well to prior topical nifedipine.  We discussed how anal fissures for from either mechanical disruption of the anoderm from passage of large or hard bowel movements or poor vascularity of the midline of the anal canal.    Fissures have approximately 50-60% healing rate from topical diltiazem or nefedipine.  For those that do not respond to dietary modification, behavioral changes, and topical treatment, there are two options for treatment: botox injection or lateral internal sphincterotomy.  We discussed that botox is ~70% effective at healing fissures and has the risks of temporary incontinence to flatus.  Lateral internal sphincterotomy has a 95% effective rate but is more invasive and involves division of part of the internal sphincter and increases the potential for fecal incontinence.  Fissures are often associated with hypertonia of the internal anal sphincter.  Limited sphincterotomy will release the tension on the internal  anal sphincter but the remaining sphincter pressure will still often be higher than average pressure.   Recommended Citrucel and topical diltiazem.  He will let me know if he wishes to proceed with limited lateral internal anal sphincterotomy.    MICHAEL Lopez MD, FACS, FASCRS  Staff Surgeon  Colon & Rectal Surgery

## 2020-10-13 NOTE — LETTER
October 13, 2020      Min Major, DO  [address]  [place] [address]           [facility]  [address]  [facility] [address]  Phone: [phone]          Patient: Will Maldonado   MR Number: [id number]   YOB: 1995   Date of Visit: 10/13/2020       Dear Dr. Min Major:    Thank you for referring Will Maldonado to me for evaluation. Attached you will find relevant portions of my assessment and plan of care.    If you have questions, please do not hesitate to call me. I look forward to following Will Maldonado along with you.    Sincerely,    Naveen Lopez MD    Enclosure  CC:  No Recipients    If you would like to receive this communication electronically, please contact [email or web address] or [phone] to request more information on [employer] access.    For providers and/or their staff who would like to refer a patient to [facility], please contact us through our one-stop-shop provider referral line, [facility] , at [phone].    If you feel you have received this communication in error or would no longer like to receive these types of communications, please e-mail [email or web address]

## 2021-01-02 ENCOUNTER — CLINICAL SUPPORT (OUTPATIENT)
Dept: URGENT CARE | Facility: CLINIC | Age: 26
End: 2021-01-02
Payer: COMMERCIAL

## 2021-01-02 DIAGNOSIS — Z11.59 ENCOUNTER FOR SCREENING FOR OTHER VIRAL DISEASES: Primary | ICD-10-CM

## 2021-01-02 LAB
CTP QC/QA: YES
SARS-COV-2 RDRP RESP QL NAA+PROBE: NEGATIVE

## 2021-01-02 PROCEDURE — U0002: ICD-10-PCS | Mod: QW,S$GLB,, | Performed by: EMERGENCY MEDICINE

## 2021-01-02 PROCEDURE — 99211 PR OFFICE/OUTPT VISIT, EST, LEVL I: ICD-10-PCS | Mod: S$GLB,,, | Performed by: EMERGENCY MEDICINE

## 2021-01-02 PROCEDURE — 99211 OFF/OP EST MAY X REQ PHY/QHP: CPT | Mod: S$GLB,,, | Performed by: EMERGENCY MEDICINE

## 2021-01-02 PROCEDURE — U0002 COVID-19 LAB TEST NON-CDC: HCPCS | Mod: QW,S$GLB,, | Performed by: EMERGENCY MEDICINE

## 2021-04-28 ENCOUNTER — PATIENT MESSAGE (OUTPATIENT)
Dept: RESEARCH | Facility: HOSPITAL | Age: 26
End: 2021-04-28

## 2021-05-10 ENCOUNTER — CLINICAL SUPPORT (OUTPATIENT)
Dept: URGENT CARE | Facility: CLINIC | Age: 26
End: 2021-05-10
Payer: COMMERCIAL

## 2021-05-10 DIAGNOSIS — Z13.9 ENCOUNTER FOR SCREENING: Primary | ICD-10-CM

## 2021-05-10 PROCEDURE — U0005 INFEC AGEN DETEC AMPLI PROBE: HCPCS | Performed by: NURSE PRACTITIONER

## 2021-05-10 PROCEDURE — U0003 INFECTIOUS AGENT DETECTION BY NUCLEIC ACID (DNA OR RNA); SEVERE ACUTE RESPIRATORY SYNDROME CORONAVIRUS 2 (SARS-COV-2) (CORONAVIRUS DISEASE [COVID-19]), AMPLIFIED PROBE TECHNIQUE, MAKING USE OF HIGH THROUGHPUT TECHNOLOGIES AS DESCRIBED BY CMS-2020-01-R: HCPCS | Performed by: NURSE PRACTITIONER

## 2021-05-12 LAB — SARS-COV-2 RNA RESP QL NAA+PROBE: NOT DETECTED

## 2021-10-04 ENCOUNTER — PATIENT MESSAGE (OUTPATIENT)
Dept: ADMINISTRATIVE | Facility: HOSPITAL | Age: 26
End: 2021-10-04

## 2021-10-21 ENCOUNTER — OFFICE VISIT (OUTPATIENT)
Dept: URGENT CARE | Facility: CLINIC | Age: 26
End: 2021-10-21
Payer: COMMERCIAL

## 2021-10-21 VITALS
WEIGHT: 181 LBS | BODY MASS INDEX: 24.52 KG/M2 | HEIGHT: 72 IN | HEART RATE: 62 BPM | DIASTOLIC BLOOD PRESSURE: 77 MMHG | TEMPERATURE: 98 F | RESPIRATION RATE: 18 BRPM | SYSTOLIC BLOOD PRESSURE: 117 MMHG | OXYGEN SATURATION: 98 %

## 2021-10-21 DIAGNOSIS — J30.2 SEASONAL ALLERGIC RHINITIS, UNSPECIFIED TRIGGER: Primary | ICD-10-CM

## 2021-10-21 DIAGNOSIS — J34.9 SINUS PROBLEM: ICD-10-CM

## 2021-10-21 LAB
CTP QC/QA: YES
SARS-COV-2 RDRP RESP QL NAA+PROBE: NEGATIVE

## 2021-10-21 PROCEDURE — 3074F SYST BP LT 130 MM HG: CPT | Mod: CPTII,S$GLB,, | Performed by: EMERGENCY MEDICINE

## 2021-10-21 PROCEDURE — 1159F PR MEDICATION LIST DOCUMENTED IN MEDICAL RECORD: ICD-10-PCS | Mod: CPTII,S$GLB,, | Performed by: EMERGENCY MEDICINE

## 2021-10-21 PROCEDURE — U0002: ICD-10-PCS | Mod: QW,S$GLB,, | Performed by: EMERGENCY MEDICINE

## 2021-10-21 PROCEDURE — 3074F PR MOST RECENT SYSTOLIC BLOOD PRESSURE < 130 MM HG: ICD-10-PCS | Mod: CPTII,S$GLB,, | Performed by: EMERGENCY MEDICINE

## 2021-10-21 PROCEDURE — 3078F PR MOST RECENT DIASTOLIC BLOOD PRESSURE < 80 MM HG: ICD-10-PCS | Mod: CPTII,S$GLB,, | Performed by: EMERGENCY MEDICINE

## 2021-10-21 PROCEDURE — 3078F DIAST BP <80 MM HG: CPT | Mod: CPTII,S$GLB,, | Performed by: EMERGENCY MEDICINE

## 2021-10-21 PROCEDURE — 99213 OFFICE O/P EST LOW 20 MIN: CPT | Mod: S$GLB,CS,, | Performed by: EMERGENCY MEDICINE

## 2021-10-21 PROCEDURE — 3008F PR BODY MASS INDEX (BMI) DOCUMENTED: ICD-10-PCS | Mod: CPTII,S$GLB,, | Performed by: EMERGENCY MEDICINE

## 2021-10-21 PROCEDURE — U0002 COVID-19 LAB TEST NON-CDC: HCPCS | Mod: QW,S$GLB,, | Performed by: EMERGENCY MEDICINE

## 2021-10-21 PROCEDURE — 1159F MED LIST DOCD IN RCRD: CPT | Mod: CPTII,S$GLB,, | Performed by: EMERGENCY MEDICINE

## 2021-10-21 PROCEDURE — 3008F BODY MASS INDEX DOCD: CPT | Mod: CPTII,S$GLB,, | Performed by: EMERGENCY MEDICINE

## 2021-10-21 PROCEDURE — 99213 PR OFFICE/OUTPT VISIT, EST, LEVL III, 20-29 MIN: ICD-10-PCS | Mod: S$GLB,CS,, | Performed by: EMERGENCY MEDICINE

## 2021-10-21 RX ORDER — BROMPHENIRAMINE MALEATE, PSEUDOEPHEDRINE HYDROCHLORIDE, AND DEXTROMETHORPHAN HYDROBROMIDE 2; 30; 10 MG/5ML; MG/5ML; MG/5ML
10 SYRUP ORAL EVERY 6 HOURS PRN
Qty: 200 ML | Refills: 0 | Status: SHIPPED | OUTPATIENT
Start: 2021-10-21 | End: 2021-10-31

## 2021-12-01 ENCOUNTER — OFFICE VISIT (OUTPATIENT)
Dept: FAMILY MEDICINE | Facility: CLINIC | Age: 26
End: 2021-12-01
Payer: COMMERCIAL

## 2021-12-01 ENCOUNTER — LAB VISIT (OUTPATIENT)
Dept: LAB | Facility: HOSPITAL | Age: 26
End: 2021-12-01
Attending: FAMILY MEDICINE
Payer: COMMERCIAL

## 2021-12-01 VITALS
HEART RATE: 65 BPM | HEIGHT: 72 IN | DIASTOLIC BLOOD PRESSURE: 68 MMHG | TEMPERATURE: 98 F | WEIGHT: 197.06 LBS | BODY MASS INDEX: 26.69 KG/M2 | SYSTOLIC BLOOD PRESSURE: 110 MMHG | OXYGEN SATURATION: 100 %

## 2021-12-01 DIAGNOSIS — Z11.59 NEED FOR HEPATITIS C SCREENING TEST: ICD-10-CM

## 2021-12-01 DIAGNOSIS — Z00.00 VISIT FOR WELL MAN HEALTH CHECK: ICD-10-CM

## 2021-12-01 DIAGNOSIS — Z00.00 VISIT FOR WELL MAN HEALTH CHECK: Primary | ICD-10-CM

## 2021-12-01 LAB
25(OH)D3+25(OH)D2 SERPL-MCNC: 41 NG/ML (ref 30–96)
ALBUMIN SERPL BCP-MCNC: 4.3 G/DL (ref 3.5–5.2)
ALP SERPL-CCNC: 62 U/L (ref 55–135)
ALT SERPL W/O P-5'-P-CCNC: 27 U/L (ref 10–44)
ANION GAP SERPL CALC-SCNC: 12 MMOL/L (ref 8–16)
AST SERPL-CCNC: 39 U/L (ref 10–40)
BASOPHILS # BLD AUTO: 0.02 K/UL (ref 0–0.2)
BASOPHILS NFR BLD: 0.3 % (ref 0–1.9)
BILIRUB SERPL-MCNC: 0.4 MG/DL (ref 0.1–1)
BUN SERPL-MCNC: 21 MG/DL (ref 6–20)
CALCIUM SERPL-MCNC: 9.3 MG/DL (ref 8.7–10.5)
CHLORIDE SERPL-SCNC: 104 MMOL/L (ref 95–110)
CHOLEST SERPL-MCNC: 210 MG/DL (ref 120–199)
CHOLEST/HDLC SERPL: 4.7 {RATIO} (ref 2–5)
CO2 SERPL-SCNC: 24 MMOL/L (ref 23–29)
CREAT SERPL-MCNC: 1.3 MG/DL (ref 0.5–1.4)
DIFFERENTIAL METHOD: NORMAL
EOSINOPHIL # BLD AUTO: 0.3 K/UL (ref 0–0.5)
EOSINOPHIL NFR BLD: 3.8 % (ref 0–8)
ERYTHROCYTE [DISTWIDTH] IN BLOOD BY AUTOMATED COUNT: 11.9 % (ref 11.5–14.5)
EST. GFR  (AFRICAN AMERICAN): >60 ML/MIN/1.73 M^2
EST. GFR  (NON AFRICAN AMERICAN): >60 ML/MIN/1.73 M^2
ESTIMATED AVG GLUCOSE: 105 MG/DL (ref 68–131)
GLUCOSE SERPL-MCNC: 84 MG/DL (ref 70–110)
HBA1C MFR BLD: 5.3 % (ref 4–5.6)
HCT VFR BLD AUTO: 45.8 % (ref 40–54)
HDLC SERPL-MCNC: 45 MG/DL (ref 40–75)
HDLC SERPL: 21.4 % (ref 20–50)
HGB BLD-MCNC: 14.9 G/DL (ref 14–18)
IMM GRANULOCYTES # BLD AUTO: 0.02 K/UL (ref 0–0.04)
IMM GRANULOCYTES NFR BLD AUTO: 0.3 % (ref 0–0.5)
LDLC SERPL CALC-MCNC: 118 MG/DL (ref 63–159)
LYMPHOCYTES # BLD AUTO: 2.6 K/UL (ref 1–4.8)
LYMPHOCYTES NFR BLD: 33.3 % (ref 18–48)
MCH RBC QN AUTO: 29.8 PG (ref 27–31)
MCHC RBC AUTO-ENTMCNC: 32.5 G/DL (ref 32–36)
MCV RBC AUTO: 92 FL (ref 82–98)
MONOCYTES # BLD AUTO: 0.7 K/UL (ref 0.3–1)
MONOCYTES NFR BLD: 9.1 % (ref 4–15)
NEUTROPHILS # BLD AUTO: 4.1 K/UL (ref 1.8–7.7)
NEUTROPHILS NFR BLD: 53.2 % (ref 38–73)
NONHDLC SERPL-MCNC: 165 MG/DL
NRBC BLD-RTO: 0 /100 WBC
PLATELET # BLD AUTO: 266 K/UL (ref 150–450)
PMV BLD AUTO: 10.7 FL (ref 9.2–12.9)
POTASSIUM SERPL-SCNC: 4.1 MMOL/L (ref 3.5–5.1)
PROT SERPL-MCNC: 7.8 G/DL (ref 6–8.4)
RBC # BLD AUTO: 5 M/UL (ref 4.6–6.2)
SODIUM SERPL-SCNC: 140 MMOL/L (ref 136–145)
TRIGL SERPL-MCNC: 235 MG/DL (ref 30–150)
TSH SERPL DL<=0.005 MIU/L-ACNC: 2.85 UIU/ML (ref 0.4–4)
WBC # BLD AUTO: 7.68 K/UL (ref 3.9–12.7)

## 2021-12-01 PROCEDURE — 86769 SARS-COV-2 COVID-19 ANTIBODY: CPT | Performed by: FAMILY MEDICINE

## 2021-12-01 PROCEDURE — 86803 HEPATITIS C AB TEST: CPT | Performed by: FAMILY MEDICINE

## 2021-12-01 PROCEDURE — 99395 PREV VISIT EST AGE 18-39: CPT | Mod: S$GLB,,, | Performed by: FAMILY MEDICINE

## 2021-12-01 PROCEDURE — 99395 PR PREVENTIVE VISIT,EST,18-39: ICD-10-PCS | Mod: S$GLB,,, | Performed by: FAMILY MEDICINE

## 2021-12-01 PROCEDURE — 85025 COMPLETE CBC W/AUTO DIFF WBC: CPT | Performed by: FAMILY MEDICINE

## 2021-12-01 PROCEDURE — 36415 COLL VENOUS BLD VENIPUNCTURE: CPT | Mod: PO | Performed by: FAMILY MEDICINE

## 2021-12-01 PROCEDURE — 83036 HEMOGLOBIN GLYCOSYLATED A1C: CPT | Performed by: FAMILY MEDICINE

## 2021-12-01 PROCEDURE — 84443 ASSAY THYROID STIM HORMONE: CPT | Performed by: FAMILY MEDICINE

## 2021-12-01 PROCEDURE — 80053 COMPREHEN METABOLIC PANEL: CPT | Performed by: FAMILY MEDICINE

## 2021-12-01 PROCEDURE — 82306 VITAMIN D 25 HYDROXY: CPT | Performed by: FAMILY MEDICINE

## 2021-12-01 PROCEDURE — 99999 PR PBB SHADOW E&M-EST. PATIENT-LVL IV: ICD-10-PCS | Mod: PBBFAC,,, | Performed by: FAMILY MEDICINE

## 2021-12-01 PROCEDURE — 99999 PR PBB SHADOW E&M-EST. PATIENT-LVL IV: CPT | Mod: PBBFAC,,, | Performed by: FAMILY MEDICINE

## 2021-12-01 PROCEDURE — 80061 LIPID PANEL: CPT | Performed by: FAMILY MEDICINE

## 2021-12-02 ENCOUNTER — PATIENT MESSAGE (OUTPATIENT)
Dept: FAMILY MEDICINE | Facility: CLINIC | Age: 26
End: 2021-12-02
Payer: COMMERCIAL

## 2021-12-02 LAB
HCV AB SERPL QL IA: NEGATIVE
SARS-COV-2 IGG SERPL IA-ACNC: 3375.8 AU/ML
SARS-COV-2 IGG SERPL QL IA: POSITIVE

## 2021-12-20 ENCOUNTER — OFFICE VISIT (OUTPATIENT)
Dept: URGENT CARE | Facility: CLINIC | Age: 26
End: 2021-12-20
Payer: COMMERCIAL

## 2021-12-20 VITALS
RESPIRATION RATE: 18 BRPM | BODY MASS INDEX: 26.68 KG/M2 | DIASTOLIC BLOOD PRESSURE: 79 MMHG | SYSTOLIC BLOOD PRESSURE: 118 MMHG | WEIGHT: 197 LBS | TEMPERATURE: 98 F | OXYGEN SATURATION: 99 % | HEART RATE: 60 BPM | HEIGHT: 72 IN

## 2021-12-20 DIAGNOSIS — J02.9 VIRAL PHARYNGITIS: ICD-10-CM

## 2021-12-20 DIAGNOSIS — J02.9 SORE THROAT: Primary | ICD-10-CM

## 2021-12-20 LAB
CTP QC/QA: YES
SARS-COV-2 RDRP RESP QL NAA+PROBE: NEGATIVE

## 2021-12-20 PROCEDURE — U0002: ICD-10-PCS | Mod: QW,S$GLB,, | Performed by: FAMILY MEDICINE

## 2021-12-20 PROCEDURE — 99213 OFFICE O/P EST LOW 20 MIN: CPT | Mod: S$GLB,,, | Performed by: FAMILY MEDICINE

## 2021-12-20 PROCEDURE — U0002 COVID-19 LAB TEST NON-CDC: HCPCS | Mod: QW,S$GLB,, | Performed by: FAMILY MEDICINE

## 2021-12-20 PROCEDURE — 99213 PR OFFICE/OUTPT VISIT, EST, LEVL III, 20-29 MIN: ICD-10-PCS | Mod: S$GLB,,, | Performed by: FAMILY MEDICINE

## 2022-01-16 ENCOUNTER — NURSE TRIAGE (OUTPATIENT)
Dept: ADMINISTRATIVE | Facility: CLINIC | Age: 27
End: 2022-01-16

## 2022-01-16 NOTE — TELEPHONE ENCOUNTER
[place]    PCP:  Dr. Major    Pt calling because he has poison ivy and it's pretty bad.  Inquiring about a virtual appt to get medication ordered.  Raised, swollen, oozing rash, on both arms, both legs, and abdomen.  Pt has been hunting.  Pt has previously had poison ivy before and having the same symptoms but having worst symptoms now.  Rash started a week and a half ago.  FA is worst and approx. 4 in x 3 in in size.  Severely itchy.  Per protocol, care advised is see HCP within 4 hrs.  OAC instruction provided.  Instructed pt that if unable to see provider on OAC within 4 hrs then go to [facility].  Pt VU and agrees.  Instructed to call for questions/concerns.  VU.    Reason for Disposition   [1] Increasing redness around poison ivy AND [2] larger than 2 inches (5 cm)    Additional Information   Negative: [1] Difficulty breathing or severe coughing AND [2] followed exposure to burning weeds   Negative: [1] Fever AND [2] spreading red area or streak (from open poison ivy sores)   Negative: [1] Fever AND [2] area is very tender to touch    Protocols used: POISON IVY - OAK - SUMAC-A-

## 2022-01-18 ENCOUNTER — OFFICE VISIT (OUTPATIENT)
Dept: INTERNAL MEDICINE | Facility: CLINIC | Age: 27
End: 2022-01-18
Payer: COMMERCIAL

## 2022-01-18 DIAGNOSIS — L25.5 CONTACT DERMATITIS DUE TO PLANTS, EXCEPT FOOD, UNSPECIFIED CONTACT DERMATITIS TYPE: Primary | ICD-10-CM

## 2022-01-18 PROCEDURE — 99213 OFFICE O/P EST LOW 20 MIN: CPT | Mod: 95,,, | Performed by: INTERNAL MEDICINE

## 2022-01-18 PROCEDURE — 99213 PR OFFICE/OUTPT VISIT, EST, LEVL III, 20-29 MIN: ICD-10-PCS | Mod: 95,,, | Performed by: INTERNAL MEDICINE

## 2022-01-18 RX ORDER — METHYLPREDNISOLONE 4 MG/1
TABLET ORAL
Qty: 1 EACH | Refills: 0 | Status: SHIPPED | OUTPATIENT
Start: 2022-01-18 | End: 2022-08-26

## 2022-01-18 NOTE — PROGRESS NOTES
Subjective:       Patient ID: Will Maldonado is a 26 y.o. male.    Chief Complaint: Rash - possible poison ivy    HPI The patient location is: [place], [place]  The chief complaint leading to consultation is: Rash - possible poison ivy    Visit type: audiovisual    Face to Face time with patient: 7 minutes of total time spent on the encounter, which includes face to face time and non-face to face time preparing to see the patient (eg, review of tests), Obtaining and/or reviewing separately obtained history, Documenting clinical information in the electronic or other health record, Independently interpreting results (not separately reported) and communicating results to the patient/family/caregiver, or Care coordination (not separately reported).         Each patient to whom he or she provides medical services by telemedicine is:  (1) informed of the relationship between the physician and patient and the respective role of any other health care provider with respect to management of the patient; and (2) notified that he or she may decline to receive medical services by telemedicine and may withdraw from such care at any time.    Notes: Pt is healthy not on meds.  Went duck hunting thru brush three weeks ago and has an itchy rash on arms and legs not going away.  No CP or SOB.  Review of Systems   Constitutional: Negative for fatigue and fever.   HENT: Negative for congestion, rhinorrhea and sore throat.    Eyes: Negative for pain.   Respiratory: Negative for cough and shortness of breath.    Cardiovascular: Negative for chest pain.   Gastrointestinal: Negative for abdominal pain, diarrhea, nausea and vomiting.   Genitourinary: Negative for dysuria.   Skin: Positive for rash.   Neurological: Negative for seizures, syncope and headaches.       Objective:      Physical Exam  Constitutional:       Appearance: Normal appearance.   Skin:     Comments: Contact dermatitis rash   Neurological:      Mental Status: He is alert.          Assessment:       No diagnosis found.    Plan:   Contact dermatitis due to plants, except food, unspecified contact dermatitis type  -     methylPREDNISolone (MEDROLKISHA,) 4 mg tablet; use as directed  Dispense: 1 each; Refill: 0    Call for worsening or lack of improvement

## 2022-08-24 DIAGNOSIS — Z00.00 ROUTINE GENERAL MEDICAL EXAMINATION AT A HEALTH CARE FACILITY: Primary | ICD-10-CM

## 2022-08-26 ENCOUNTER — CLINICAL SUPPORT (OUTPATIENT)
Dept: INTERNAL MEDICINE | Facility: CLINIC | Age: 27
End: 2022-08-26
Payer: COMMERCIAL

## 2022-08-26 ENCOUNTER — OFFICE VISIT (OUTPATIENT)
Dept: INTERNAL MEDICINE | Facility: CLINIC | Age: 27
End: 2022-08-26
Payer: COMMERCIAL

## 2022-08-26 ENCOUNTER — HOSPITAL ENCOUNTER (OUTPATIENT)
Dept: CARDIOLOGY | Facility: CLINIC | Age: 27
Discharge: HOME | End: 2022-08-26
Payer: COMMERCIAL

## 2022-08-26 ENCOUNTER — HOSPITAL ENCOUNTER (OUTPATIENT)
Dept: RADIOLOGY | Facility: HOSPITAL | Age: 27
Discharge: HOME | End: 2022-08-26
Attending: INTERNAL MEDICINE
Payer: COMMERCIAL

## 2022-08-26 VITALS
WEIGHT: 187 LBS | SYSTOLIC BLOOD PRESSURE: 121 MMHG | BODY MASS INDEX: 24.78 KG/M2 | HEIGHT: 73 IN | HEART RATE: 64 BPM | DIASTOLIC BLOOD PRESSURE: 74 MMHG

## 2022-08-26 DIAGNOSIS — Z00.00 ROUTINE GENERAL MEDICAL EXAMINATION AT A HEALTH CARE FACILITY: Primary | ICD-10-CM

## 2022-08-26 DIAGNOSIS — Z00.00 ANNUAL PHYSICAL EXAM: Primary | ICD-10-CM

## 2022-08-26 DIAGNOSIS — Z00.00 ROUTINE GENERAL MEDICAL EXAMINATION AT A HEALTH CARE FACILITY: ICD-10-CM

## 2022-08-26 DIAGNOSIS — L25.5 CONTACT DERMATITIS DUE TO PLANTS, EXCEPT FOOD, UNSPECIFIED CONTACT DERMATITIS TYPE: ICD-10-CM

## 2022-08-26 LAB
ALBUMIN SERPL BCP-MCNC: 4.5 G/DL (ref 3.5–5.2)
ALP SERPL-CCNC: 61 U/L (ref 55–135)
ALT SERPL W/O P-5'-P-CCNC: 31 U/L (ref 10–44)
ANION GAP SERPL CALC-SCNC: 8 MMOL/L (ref 8–16)
AST SERPL-CCNC: 36 U/L (ref 10–40)
BILIRUB SERPL-MCNC: 0.6 MG/DL (ref 0.1–1)
BUN SERPL-MCNC: 19 MG/DL (ref 6–20)
CALCIUM SERPL-MCNC: 9.4 MG/DL (ref 8.7–10.5)
CHLORIDE SERPL-SCNC: 102 MMOL/L (ref 95–110)
CHOLEST SERPL-MCNC: 177 MG/DL (ref 120–199)
CHOLEST/HDLC SERPL: 4.2 {RATIO} (ref 2–5)
CO2 SERPL-SCNC: 26 MMOL/L (ref 23–29)
CREAT SERPL-MCNC: 1.3 MG/DL (ref 0.5–1.4)
ERYTHROCYTE [DISTWIDTH] IN BLOOD BY AUTOMATED COUNT: 12.4 % (ref 11.5–14.5)
EST. GFR  (NO RACE VARIABLE): >60 ML/MIN/1.73 M^2
ESTIMATED AVG GLUCOSE: 103 MG/DL (ref 68–131)
GLUCOSE SERPL-MCNC: 86 MG/DL (ref 70–110)
HBA1C MFR BLD: 5.2 % (ref 4–5.6)
HCT VFR BLD AUTO: 43.6 % (ref 40–54)
HDLC SERPL-MCNC: 42 MG/DL (ref 40–75)
HDLC SERPL: 23.7 % (ref 20–50)
HGB BLD-MCNC: 15.1 G/DL (ref 14–18)
LDLC SERPL CALC-MCNC: 107.4 MG/DL (ref 63–159)
MCH RBC QN AUTO: 31.5 PG (ref 27–31)
MCHC RBC AUTO-ENTMCNC: 34.6 G/DL (ref 32–36)
MCV RBC AUTO: 91 FL (ref 82–98)
NONHDLC SERPL-MCNC: 135 MG/DL
PLATELET # BLD AUTO: 250 K/UL (ref 150–450)
PMV BLD AUTO: 10.2 FL (ref 9.2–12.9)
POTASSIUM SERPL-SCNC: 4 MMOL/L (ref 3.5–5.1)
PROT SERPL-MCNC: 7.9 G/DL (ref 6–8.4)
RBC # BLD AUTO: 4.8 M/UL (ref 4.6–6.2)
SODIUM SERPL-SCNC: 136 MMOL/L (ref 136–145)
TRIGL SERPL-MCNC: 138 MG/DL (ref 30–150)
TSH SERPL DL<=0.005 MIU/L-ACNC: 2.61 UIU/ML (ref 0.4–4)
WBC # BLD AUTO: 5.2 K/UL (ref 3.9–12.7)

## 2022-08-26 PROCEDURE — 1159F MED LIST DOCD IN RCRD: CPT | Mod: CPTII,S$GLB,, | Performed by: INTERNAL MEDICINE

## 2022-08-26 PROCEDURE — 93010 ELECTROCARDIOGRAM REPORT: CPT | Mod: S$GLB,,, | Performed by: INTERNAL MEDICINE

## 2022-08-26 PROCEDURE — 3044F PR MOST RECENT HEMOGLOBIN A1C LEVEL <7.0%: ICD-10-PCS | Mod: CPTII,S$GLB,, | Performed by: INTERNAL MEDICINE

## 2022-08-26 PROCEDURE — 99999 PR PBB SHADOW E&M-EST. PATIENT-LVL I: CPT | Mod: PBBFAC,,,

## 2022-08-26 PROCEDURE — 93005 EKG 12-LEAD: ICD-10-PCS | Mod: S$GLB,,, | Performed by: INTERNAL MEDICINE

## 2022-08-26 PROCEDURE — 99999 PR PBB SHADOW E&M-EST. PATIENT-LVL I: ICD-10-PCS | Mod: PBBFAC,,,

## 2022-08-26 PROCEDURE — 99395 PREV VISIT EST AGE 18-39: CPT | Mod: S$GLB,,, | Performed by: INTERNAL MEDICINE

## 2022-08-26 PROCEDURE — 99395 PR PREVENTIVE VISIT,EST,18-39: ICD-10-PCS | Mod: S$GLB,,, | Performed by: INTERNAL MEDICINE

## 2022-08-26 PROCEDURE — 3078F PR MOST RECENT DIASTOLIC BLOOD PRESSURE < 80 MM HG: ICD-10-PCS | Mod: CPTII,S$GLB,, | Performed by: INTERNAL MEDICINE

## 2022-08-26 PROCEDURE — 71046 X-RAY EXAM CHEST 2 VIEWS: CPT | Mod: 26,,, | Performed by: RADIOLOGY

## 2022-08-26 PROCEDURE — 97750 PHYSICAL PERFORMANCE TEST: CPT | Mod: S$GLB,,, | Performed by: INTERNAL MEDICINE

## 2022-08-26 PROCEDURE — 3074F SYST BP LT 130 MM HG: CPT | Mod: CPTII,S$GLB,, | Performed by: INTERNAL MEDICINE

## 2022-08-26 PROCEDURE — 71046 XR CHEST PA AND LATERAL: ICD-10-PCS | Mod: 26,,, | Performed by: RADIOLOGY

## 2022-08-26 PROCEDURE — 97750 PR PHYSICAL PERFORMANCE TEST: ICD-10-PCS | Mod: S$GLB,,, | Performed by: INTERNAL MEDICINE

## 2022-08-26 PROCEDURE — 93005 ELECTROCARDIOGRAM TRACING: CPT | Mod: S$GLB,,, | Performed by: INTERNAL MEDICINE

## 2022-08-26 PROCEDURE — 80061 LIPID PANEL: CPT | Performed by: INTERNAL MEDICINE

## 2022-08-26 PROCEDURE — 1160F RVW MEDS BY RX/DR IN RCRD: CPT | Mod: CPTII,S$GLB,, | Performed by: INTERNAL MEDICINE

## 2022-08-26 PROCEDURE — 36415 COLL VENOUS BLD VENIPUNCTURE: CPT | Performed by: INTERNAL MEDICINE

## 2022-08-26 PROCEDURE — 80053 COMPREHEN METABOLIC PANEL: CPT | Performed by: INTERNAL MEDICINE

## 2022-08-26 PROCEDURE — 83036 HEMOGLOBIN GLYCOSYLATED A1C: CPT | Performed by: INTERNAL MEDICINE

## 2022-08-26 PROCEDURE — 3044F HG A1C LEVEL LT 7.0%: CPT | Mod: CPTII,S$GLB,, | Performed by: INTERNAL MEDICINE

## 2022-08-26 PROCEDURE — 99999 PR PBB SHADOW E&M-EST. PATIENT-LVL III: ICD-10-PCS | Mod: PBBFAC,,, | Performed by: INTERNAL MEDICINE

## 2022-08-26 PROCEDURE — 3008F PR BODY MASS INDEX (BMI) DOCUMENTED: ICD-10-PCS | Mod: CPTII,S$GLB,, | Performed by: INTERNAL MEDICINE

## 2022-08-26 PROCEDURE — 97802 PR MED NUTR THER, 1ST, INDIV, EA 15 MIN: ICD-10-PCS | Mod: S$GLB,,, | Performed by: INTERNAL MEDICINE

## 2022-08-26 PROCEDURE — 71046 X-RAY EXAM CHEST 2 VIEWS: CPT | Mod: TC,FY

## 2022-08-26 PROCEDURE — 3074F PR MOST RECENT SYSTOLIC BLOOD PRESSURE < 130 MM HG: ICD-10-PCS | Mod: CPTII,S$GLB,, | Performed by: INTERNAL MEDICINE

## 2022-08-26 PROCEDURE — 3078F DIAST BP <80 MM HG: CPT | Mod: CPTII,S$GLB,, | Performed by: INTERNAL MEDICINE

## 2022-08-26 PROCEDURE — 85027 COMPLETE CBC AUTOMATED: CPT | Performed by: INTERNAL MEDICINE

## 2022-08-26 PROCEDURE — 84443 ASSAY THYROID STIM HORMONE: CPT | Performed by: INTERNAL MEDICINE

## 2022-08-26 PROCEDURE — 97802 MEDICAL NUTRITION INDIV IN: CPT | Mod: S$GLB,,, | Performed by: INTERNAL MEDICINE

## 2022-08-26 PROCEDURE — 99999 PR PBB SHADOW E&M-EST. PATIENT-LVL III: CPT | Mod: PBBFAC,,, | Performed by: INTERNAL MEDICINE

## 2022-08-26 PROCEDURE — 3008F BODY MASS INDEX DOCD: CPT | Mod: CPTII,S$GLB,, | Performed by: INTERNAL MEDICINE

## 2022-08-26 PROCEDURE — 1160F PR REVIEW ALL MEDS BY PRESCRIBER/CLIN PHARMACIST DOCUMENTED: ICD-10-PCS | Mod: CPTII,S$GLB,, | Performed by: INTERNAL MEDICINE

## 2022-08-26 PROCEDURE — 93010 EKG 12-LEAD: ICD-10-PCS | Mod: S$GLB,,, | Performed by: INTERNAL MEDICINE

## 2022-08-26 PROCEDURE — 1159F PR MEDICATION LIST DOCUMENTED IN MEDICAL RECORD: ICD-10-PCS | Mod: CPTII,S$GLB,, | Performed by: INTERNAL MEDICINE

## 2022-08-26 RX ORDER — PREDNISONE 20 MG/1
40 TABLET ORAL DAILY
Qty: 10 TABLET | Refills: 1 | Status: SHIPPED | OUTPATIENT
Start: 2022-08-26 | End: 2022-08-31

## 2022-08-26 RX ORDER — TRIAMCINOLONE ACETONIDE 5 MG/G
CREAM TOPICAL 2 TIMES DAILY
Qty: 60 G | Refills: 3 | Status: SHIPPED | OUTPATIENT
Start: 2022-08-26

## 2022-08-26 NOTE — PROGRESS NOTES
Nutrition Assessment  Session Time:  60 minutes      Client name:  Will Maldonado  :  1995  Age:  27 y.o.  Gender:  male    Client states he is here for his first annual Executive Health medical examination. Mr. 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 He states he works out for an hour in the morning (weight training + sauna) before going to work at least 4 days a week. He also enjoys staying active by walking the dog at night, and working on projects around his house. He likes running, but admits to not being too consistent, running once or twice a month. No significant PMH on file.     Anthropometrics  Height:  72 inches     Weight:  187.1 pounds  BMI:  25.4  % Body Fat:  16.8    Clinical Signs/Symptoms  N/V/D:  none  Appetite:  good       No past medical history on file.    Past Surgical History:   Procedure Laterality Date    lipoma removal  2021    right forehead       Medications    has a current medication list which includes the following prescription(s): cetirizine, fluticasone propionate, prednisone, and triamcinolone acetonide 0.5%.    Vitamins, Minerals, and/or Supplements:  Protein pwd in the am     Food/Medication Interactions:  Reviewed     Food Allergies or Intolerances:  NKFA     Social History    Marital status:    Employment:  [employer]    Social History     Tobacco Use    Smoking status: Never Smoker    Smokeless tobacco: Never Used   Substance Use Topics    Alcohol use: Yes     Comment: socially, on the weekends. about 5 drinks/week.         Lab Reports   Sodium   Date Value Ref Range Status   2022 136 136 - 145 mmol/L Final     Potassium   Date Value Ref Range Status   2022 4.0 3.5 - 5.1 mmol/L Final     Chloride   Date Value Ref Range Status   2022 102 95 - 110 mmol/L Final     CO2   Date Value Ref Range  Status   08/26/2022 26 23 - 29 mmol/L Final     Glucose   Date Value Ref Range Status   08/26/2022 86 70 - 110 mg/dL Final     BUN   Date Value Ref Range Status   08/26/2022 19 6 - 20 mg/dL Final     Creatinine   Date Value Ref Range Status   08/26/2022 1.3 0.5 - 1.4 mg/dL Final     Calcium   Date Value Ref Range Status   08/26/2022 9.4 8.7 - 10.5 mg/dL Final     Total Protein   Date Value Ref Range Status   08/26/2022 7.9 6.0 - 8.4 g/dL Final     Albumin   Date Value Ref Range Status   08/26/2022 4.5 3.5 - 5.2 g/dL Final     Total Bilirubin   Date Value Ref Range Status   08/26/2022 0.6 0.1 - 1.0 mg/dL Final     Comment:     For infants and newborns, interpretation of results should be based  on gestational age, weight and in agreement with clinical  observations.    Premature Infant recommended reference ranges:  Up to 24 hours.............<8.0 mg/dL  Up to 48 hours............<12.0 mg/dL  3-5 days..................<15.0 mg/dL  6-29 days.................<15.0 mg/dL       Alkaline Phosphatase   Date Value Ref Range Status   08/26/2022 61 55 - 135 U/L Final     AST   Date Value Ref Range Status   08/26/2022 36 10 - 40 U/L Final     ALT   Date Value Ref Range Status   08/26/2022 31 10 - 44 U/L Final     Anion Gap   Date Value Ref Range Status   08/26/2022 8 8 - 16 mmol/L Final     eGFR if    Date Value Ref Range Status   12/01/2021 >60.0 >60 mL/min/1.73 m^2 Final     eGFR if non    Date Value Ref Range Status   12/01/2021 >60.0 >60 mL/min/1.73 m^2 Final     Comment:     Calculation used to obtain the estimated glomerular filtration  rate (eGFR) is the CKD-EPI equation.         Lab Results   Component Value Date    WBC 5.20 08/26/2022    HGB 15.1 08/26/2022    HCT 43.6 08/26/2022    MCV 91 08/26/2022     08/26/2022        Lab Results   Component Value Date    CHOL 177 08/26/2022     Lab Results   Component Value Date    HDL 42 08/26/2022     Lab Results   Component Value Date     LDLCALC 107.4 08/26/2022     Lab Results   Component Value Date    TRIG 138 08/26/2022     Lab Results   Component Value Date    CHOLHDL 23.7 08/26/2022     Lab Results   Component Value Date    HGBA1C 5.2 08/26/2022     BP Readings from Last 1 Encounters:   08/26/22 121/74       Food History  GYM (5am)  Breakfast:  7am Oats, almond milk, blueberry and protein pwd + coffee (2-3 cups black)  Mid-morning Snack:  Gluten-free muffins (chocolate chip, peanut butter, banana) + banana / almonds  Lunch:  11am grilled chicken + brown rice (w Crystal hot sauce) + vegetable (zucchini/asparagus) / leftovers from dinner /1/2 avocado w a spoon + 1 piece of dark chocolate (jayro's bliss) + water  Mid-afternoon Snack:  Almonds / chocolate / deer jerky  Dinner:  Chicken + Rice + veg /  Sweet potatoes / hamburgers no bread w pickles and mustard + chicken / fish once a week / beef once a week (ground beef / beef stew) / Restaurant [employer] (shrimp dexter w lamb shank + eclair w ice cream) / Kay (beef brisket) / Sushi + water/beer on the weekends (bourbon/ vodka water lime/non-alcohol beer).  H.S. Snack:  chocolate  *Fluid intake:  Water, coffee, beer, bourbon, vodka, non-alcoholic, liquid IV    Exercise History:  Strength training 4 days a week minimum, running once every two weeks    Cultural/Spiritual/Personal Preferences:  None identified    Support System:  spouse    State of Change:  Action    Barriers to Change:  none    Diagnosis    No nutrition diagnosis at this time.    Intervention    RMR (Method:  InBody):  1895 kcal  Activity Factor:  1.4    CHANTEL:  2650 kcal    Goals:  1.  Continue current physical activity routine and increase cardio as tolerated  2.  Continue intake of balanced meals (vegetables, lean proteins, high-fiber starches)  3.  Limit alcohol intake to 2 standard drinks per day max    Nutrition Education  The following education was provided to the patient:  Complimented patient on proactive role in health  maintenance.  Complimented patient on physical activity efforts.  Discussed meal planning/[employer] My Plate design.  Discussed healthy snacking.   Discussed tips for eating healthy when dining out.  Discussed nutrition-related lab values and dietary and/or lifestyle factors affecting them.  Discussed alcohol consumption (potential health consequences of excessive alcohol intake, recommended intake for men and women, recommended serving sizes, ways to reduce and/or moderate intake, etc.).  Discussed benefits of adequate hydration and recommended fluid intake.  Discussed [facility] client resources (may include but not limited to [facility] Eat Fit Shopping List, Fast Food Guide, Lite Restaurant Guide, and Meal Planning Guide).  Discussed post-workout nutrition.  Patient verbalized understanding of nutrition education and recommendations received.    Handouts Provided  Meal Planning Guide  Restaurant Guide  Eat Fit Shopping List  Eat Fit Aliyah  Fueling Well On-The-Go  Vitamin/Mineral Guide    Monitoring/Evaluation    Monitor the following:  Weight  BMI  % Body Fat  Caloric intake  Labs:  [facility]    Follow Up Plan:  Communication with referring healthcare provider is unnecessary at this time as patient presented as part of annual wellness exam.  However, will follow up with patient in 1-2 years.

## 2022-08-26 NOTE — PROGRESS NOTES
Subjective:       Patient ID: Will Maldonado is a 27 y.o. male.    Chief Complaint: No chief complaint on file.    HPI   Pt. Has no significant cardiovascular or pulmonary history.    Physical Limitations:  Patient denied any limitations to physical activity     Current exercise routine:  Patient currently goes to 4 days a week where he practices full body resistance training and stretching.  Patient runs for 30-40 minutes, once a week.    Goals:  Patient would like to maintain his current weight   Fun Facts:  Patient was very friendly and engaged.  Patient noted that he used to play soccer and is interested in joining a league after a 5 year hiatus.  Patient stated that this is the best shape he has ever been in but would like to work past his lifting plateaus.  We discussed different methods to progressing resistance training routines.  Patient asked questions and was receptive to all recommendations made.      Review of Systems      Objective:     The fitness evaluation results are as follows:  D.O.S. 8/26/2022   Height (in): 72   Weight (lbs): 187.1   BMI: 25.137546   Body Fat (%): 16.80   Waist (cm): 83   RBP (mmHg): 120/72   RHR (bpm): 56    Strength Dominant (Lbs): 155    Strength Non Dominant (Lbs): 140   Push-up Assessment: 36   Curl-up Assessment: 75   Flexibility Testing (cm): 31   REE (kcals): 1895       Physical Exam    Assessment:     Age/gender stratified assessment:  Resting BP: Within Normal Limits   Body Fat %: Very Good   Waist Circumfernece: Low Risk    Strength Dominant: 90th    Strength Non Dominant: 90th   Upper Body Endurance: Excellent   Abdominal Endurance: Well Above Average   Lower body Flexibiltiy: Good       Problem List Items Addressed This Visit    None     Visit Diagnoses     Routine general medical examination at a health care facility    -  Primary          Plan:       Recommended fitness guidelines:    -150 minutes of moderate intensity aerobic exercise per  week or 75 minutes of vigorous intensity aerobic exercise per week.  Add 1-2 more days of running to your current aerobic exercise routine.      -2 to 4 days per week of resistance training for each muscle group.  Add one more day of lower body resistance training to your current routine.  Make sure that you are keeping your body challenged and progressing your exercise routine by adding sets, repetitions, weight, or switching up an exercise itself about every 6-8 weeks or once an exercise starts to feel too easy for you.      -Daily stretching with a hold of at least 30 seconds per muscle group.

## 2022-08-28 NOTE — PROGRESS NOTES
Subjective:       Patient ID: Will Maldonado is a 27 y.o. male.    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  He significant past history except for allergic rhinitis but also more recently has had recurrent poison ivy mostly on his arms.  He has been wearing long sleeves when he is hunting an on the job but still seems to be alli the rash.  He is using over-the-counter creams with some success.  Today had a CMP, CBC, lipid profile, TSH and hemoglobin A1c in all within normal limits.  Last year his cholesterol has jumped to 210 and he had an elevated triglycerides in this year his total cholesterol came down to 177 with a normal triglyceride level.  He has been watching his diet and exercising.  He also had an EKG that showed a sinus bradycardia but otherwise normal.  He had a normal chest x-ray as well    Review of Systems   Constitutional:  Negative for activity change, diaphoresis, fatigue and fever.   HENT:  Negative for nasal congestion, ear pain, postnasal drip, sinus pressure/congestion, sore throat and trouble swallowing.    Eyes:  Negative for pain.   Respiratory:  Negative for cough, chest tightness, shortness of breath and wheezing.    Cardiovascular:  Negative for chest pain, palpitations and leg swelling.   Gastrointestinal:  Negative for abdominal distention, abdominal pain, blood in stool, constipation and diarrhea.   Endocrine: Negative for cold intolerance and heat intolerance.   Genitourinary:  Negative for decreased urine volume, difficulty urinating, dysuria, flank pain, frequency and hematuria.   Musculoskeletal:  Negative for arthralgias, back pain, joint swelling, myalgias and neck pain.   Integumentary:  Positive for rash. Negative for pallor and wound.   Neurological:  Negative for tremors, syncope, weakness, light-headedness, numbness and headaches.   Hematological:   Negative for adenopathy.   Psychiatric/Behavioral:  Negative for confusion, decreased concentration, hallucinations, self-injury, sleep disturbance and suicidal ideas. The patient is not nervous/anxious.        Objective:      Physical Exam  Constitutional:       General: He is not in acute distress.     Appearance: Normal appearance.   HENT:      Head: Normocephalic and atraumatic.   Eyes:      General: No scleral icterus.     Conjunctiva/sclera: Conjunctivae normal.   Cardiovascular:      Rate and Rhythm: Normal rate and regular rhythm.      Heart sounds: Normal heart sounds.   Pulmonary:      Effort: Pulmonary effort is normal.      Breath sounds: Normal breath sounds.   Abdominal:      Palpations: Abdomen is soft.   Musculoskeletal:         General: Normal range of motion.      Cervical back: Normal range of motion and neck supple.   Skin:     General: Skin is warm and dry.      Findings: Rash present. Rash is nodular. Rash is not purpuric or urticarial.          Neurological:      General: No focal deficit present.      Mental Status: He is alert.   Psychiatric:         Mood and Affect: Mood normal.         Behavior: Behavior normal.         Thought Content: Thought content normal.         Judgment: Judgment normal.       Assessment:       Routine general medical examination at a health care facility     Health Maintenance   Topic Date Due    TETANUS VACCINE  11/15/2028    Hepatitis C Screening  Completed    Lipid Panel  Completed     Health Maintenance Reviewed    Contact dermatitis due to plants, except food, unspecified contact dermatitis type  -     predniSONE (DELTASONE) 20 MG tablet; Take 2 tablets (40 mg total) by mouth once daily. for 5 days  Dispense: 10 tablet; Refill: 1  -     triamcinolone acetonide 0.5% (KENALOG) 0.5 % Crea; Apply topically 2 (two) times daily.  Dispense: 60 g; Refill: 3    Nice improvement in your lipid profile Aron!

## 2023-08-02 DIAGNOSIS — Z00.00 ROUTINE GENERAL MEDICAL EXAMINATION AT A HEALTH CARE FACILITY: Primary | ICD-10-CM

## 2023-09-12 ENCOUNTER — NUTRITION (OUTPATIENT)
Dept: INTERNAL MEDICINE | Facility: CLINIC | Age: 28
End: 2023-09-12
Payer: COMMERCIAL

## 2023-09-12 ENCOUNTER — OFFICE VISIT (OUTPATIENT)
Dept: INTERNAL MEDICINE | Facility: CLINIC | Age: 28
End: 2023-09-12
Payer: COMMERCIAL

## 2023-09-12 ENCOUNTER — CLINICAL SUPPORT (OUTPATIENT)
Dept: INTERNAL MEDICINE | Facility: CLINIC | Age: 28
End: 2023-09-12
Payer: COMMERCIAL

## 2023-09-12 ENCOUNTER — HOSPITAL ENCOUNTER (OUTPATIENT)
Dept: CARDIOLOGY | Facility: CLINIC | Age: 28
Discharge: HOME | End: 2023-09-12
Payer: COMMERCIAL

## 2023-09-12 VITALS
BODY MASS INDEX: 24.08 KG/M2 | DIASTOLIC BLOOD PRESSURE: 86 MMHG | HEIGHT: 73 IN | SYSTOLIC BLOOD PRESSURE: 122 MMHG | HEART RATE: 66 BPM | WEIGHT: 181.69 LBS | OXYGEN SATURATION: 99 %

## 2023-09-12 DIAGNOSIS — R79.89 ABNORMAL TSH: ICD-10-CM

## 2023-09-12 DIAGNOSIS — Z00.00 ANNUAL PHYSICAL EXAM: Primary | ICD-10-CM

## 2023-09-12 DIAGNOSIS — E78.5 HYPERLIPIDEMIA, UNSPECIFIED HYPERLIPIDEMIA TYPE: ICD-10-CM

## 2023-09-12 DIAGNOSIS — Z00.00 ROUTINE GENERAL MEDICAL EXAMINATION AT A HEALTH CARE FACILITY: Primary | ICD-10-CM

## 2023-09-12 DIAGNOSIS — F41.8 SITUATIONAL ANXIETY: ICD-10-CM

## 2023-09-12 DIAGNOSIS — Z00.00 ROUTINE GENERAL MEDICAL EXAMINATION AT A HEALTH CARE FACILITY: ICD-10-CM

## 2023-09-12 DIAGNOSIS — H61.21 IMPACTED CERUMEN OF RIGHT EAR: ICD-10-CM

## 2023-09-12 DIAGNOSIS — Z00.00 ROUTINE PHYSICAL EXAMINATION: Primary | ICD-10-CM

## 2023-09-12 LAB
ALBUMIN SERPL BCP-MCNC: 4.5 G/DL (ref 3.5–5.2)
ALP SERPL-CCNC: 62 U/L (ref 55–135)
ALT SERPL W/O P-5'-P-CCNC: 40 U/L (ref 10–44)
ANION GAP SERPL CALC-SCNC: 12 MMOL/L (ref 8–16)
AST SERPL-CCNC: 39 U/L (ref 10–40)
BILIRUB SERPL-MCNC: 0.8 MG/DL (ref 0.1–1)
BUN SERPL-MCNC: 19 MG/DL (ref 6–20)
CALCIUM SERPL-MCNC: 9.9 MG/DL (ref 8.7–10.5)
CHLORIDE SERPL-SCNC: 105 MMOL/L (ref 95–110)
CHOLEST SERPL-MCNC: 221 MG/DL (ref 120–199)
CHOLEST/HDLC SERPL: 3.9 {RATIO} (ref 2–5)
CO2 SERPL-SCNC: 25 MMOL/L (ref 23–29)
CREAT SERPL-MCNC: 1.3 MG/DL (ref 0.5–1.4)
ERYTHROCYTE [DISTWIDTH] IN BLOOD BY AUTOMATED COUNT: 12.2 % (ref 11.5–14.5)
EST. GFR  (NO RACE VARIABLE): >60 ML/MIN/1.73 M^2
ESTIMATED AVG GLUCOSE: 103 MG/DL (ref 68–131)
GLUCOSE SERPL-MCNC: 87 MG/DL (ref 70–110)
HBA1C MFR BLD: 5.2 % (ref 4–5.6)
HCT VFR BLD AUTO: 47.6 % (ref 40–54)
HDLC SERPL-MCNC: 56 MG/DL (ref 40–75)
HDLC SERPL: 25.3 % (ref 20–50)
HGB BLD-MCNC: 15.9 G/DL (ref 14–18)
LDLC SERPL CALC-MCNC: 143 MG/DL (ref 63–159)
MCH RBC QN AUTO: 30.4 PG (ref 27–31)
MCHC RBC AUTO-ENTMCNC: 33.4 G/DL (ref 32–36)
MCV RBC AUTO: 91 FL (ref 82–98)
NONHDLC SERPL-MCNC: 165 MG/DL
PLATELET # BLD AUTO: 242 K/UL (ref 150–450)
PMV BLD AUTO: 10.1 FL (ref 9.2–12.9)
POTASSIUM SERPL-SCNC: 4.7 MMOL/L (ref 3.5–5.1)
PROT SERPL-MCNC: 7.9 G/DL (ref 6–8.4)
RBC # BLD AUTO: 5.23 M/UL (ref 4.6–6.2)
SODIUM SERPL-SCNC: 142 MMOL/L (ref 136–145)
T4 FREE SERPL-MCNC: 1.02 NG/DL (ref 0.71–1.51)
TRIGL SERPL-MCNC: 110 MG/DL (ref 30–150)
TSH SERPL DL<=0.005 MIU/L-ACNC: 4.12 UIU/ML (ref 0.4–4)
WBC # BLD AUTO: 5.92 K/UL (ref 3.9–12.7)

## 2023-09-12 PROCEDURE — 3079F PR MOST RECENT DIASTOLIC BLOOD PRESSURE 80-89 MM HG: ICD-10-PCS | Mod: CPTII,S$GLB,, | Performed by: INTERNAL MEDICINE

## 2023-09-12 PROCEDURE — 85027 COMPLETE CBC AUTOMATED: CPT | Performed by: INTERNAL MEDICINE

## 2023-09-12 PROCEDURE — 93005 EKG 12-LEAD: ICD-10-PCS | Mod: S$GLB,,, | Performed by: INTERNAL MEDICINE

## 2023-09-12 PROCEDURE — 3044F HG A1C LEVEL LT 7.0%: CPT | Mod: CPTII,S$GLB,, | Performed by: INTERNAL MEDICINE

## 2023-09-12 PROCEDURE — 84443 ASSAY THYROID STIM HORMONE: CPT | Performed by: INTERNAL MEDICINE

## 2023-09-12 PROCEDURE — 3008F PR BODY MASS INDEX (BMI) DOCUMENTED: ICD-10-PCS | Mod: CPTII,S$GLB,, | Performed by: INTERNAL MEDICINE

## 2023-09-12 PROCEDURE — 1160F PR REVIEW ALL MEDS BY PRESCRIBER/CLIN PHARMACIST DOCUMENTED: ICD-10-PCS | Mod: CPTII,S$GLB,, | Performed by: INTERNAL MEDICINE

## 2023-09-12 PROCEDURE — 84439 ASSAY OF FREE THYROXINE: CPT | Performed by: INTERNAL MEDICINE

## 2023-09-12 PROCEDURE — 99999 PR PBB SHADOW E&M-EST. PATIENT-LVL I: ICD-10-PCS | Mod: PBBFAC,,,

## 2023-09-12 PROCEDURE — 97750 PR PHYSICAL PERFORMANCE TEST: ICD-10-PCS | Mod: GP,S$GLB,, | Performed by: INTERNAL MEDICINE

## 2023-09-12 PROCEDURE — 93010 ELECTROCARDIOGRAM REPORT: CPT | Mod: S$GLB,,, | Performed by: INTERNAL MEDICINE

## 2023-09-12 PROCEDURE — 3074F PR MOST RECENT SYSTOLIC BLOOD PRESSURE < 130 MM HG: ICD-10-PCS | Mod: CPTII,S$GLB,, | Performed by: INTERNAL MEDICINE

## 2023-09-12 PROCEDURE — 3079F DIAST BP 80-89 MM HG: CPT | Mod: CPTII,S$GLB,, | Performed by: INTERNAL MEDICINE

## 2023-09-12 PROCEDURE — 1159F MED LIST DOCD IN RCRD: CPT | Mod: CPTII,S$GLB,, | Performed by: INTERNAL MEDICINE

## 2023-09-12 PROCEDURE — 97750 PHYSICAL PERFORMANCE TEST: CPT | Mod: GP,S$GLB,, | Performed by: INTERNAL MEDICINE

## 2023-09-12 PROCEDURE — 36415 COLL VENOUS BLD VENIPUNCTURE: CPT | Performed by: INTERNAL MEDICINE

## 2023-09-12 PROCEDURE — 3044F PR MOST RECENT HEMOGLOBIN A1C LEVEL <7.0%: ICD-10-PCS | Mod: CPTII,S$GLB,, | Performed by: INTERNAL MEDICINE

## 2023-09-12 PROCEDURE — 97802 MEDICAL NUTRITION INDIV IN: CPT | Mod: S$GLB,,, | Performed by: DIETITIAN, REGISTERED

## 2023-09-12 PROCEDURE — 99999 PR PBB SHADOW E&M-EST. PATIENT-LVL III: CPT | Mod: PBBFAC,,, | Performed by: INTERNAL MEDICINE

## 2023-09-12 PROCEDURE — 93010 EKG 12-LEAD: ICD-10-PCS | Mod: S$GLB,,, | Performed by: INTERNAL MEDICINE

## 2023-09-12 PROCEDURE — 93005 ELECTROCARDIOGRAM TRACING: CPT | Mod: S$GLB,,, | Performed by: INTERNAL MEDICINE

## 2023-09-12 PROCEDURE — 1159F PR MEDICATION LIST DOCUMENTED IN MEDICAL RECORD: ICD-10-PCS | Mod: CPTII,S$GLB,, | Performed by: INTERNAL MEDICINE

## 2023-09-12 PROCEDURE — 99395 PR PREVENTIVE VISIT,EST,18-39: ICD-10-PCS | Mod: S$GLB,,, | Performed by: INTERNAL MEDICINE

## 2023-09-12 PROCEDURE — 99395 PREV VISIT EST AGE 18-39: CPT | Mod: S$GLB,,, | Performed by: INTERNAL MEDICINE

## 2023-09-12 PROCEDURE — 80061 LIPID PANEL: CPT | Performed by: INTERNAL MEDICINE

## 2023-09-12 PROCEDURE — 3008F BODY MASS INDEX DOCD: CPT | Mod: CPTII,S$GLB,, | Performed by: INTERNAL MEDICINE

## 2023-09-12 PROCEDURE — 99999 PR PBB SHADOW E&M-EST. PATIENT-LVL III: ICD-10-PCS | Mod: PBBFAC,,, | Performed by: INTERNAL MEDICINE

## 2023-09-12 PROCEDURE — 80053 COMPREHEN METABOLIC PANEL: CPT | Performed by: INTERNAL MEDICINE

## 2023-09-12 PROCEDURE — 1160F RVW MEDS BY RX/DR IN RCRD: CPT | Mod: CPTII,S$GLB,, | Performed by: INTERNAL MEDICINE

## 2023-09-12 PROCEDURE — 97802 PR MED NUTR THER, 1ST, INDIV, EA 15 MIN: ICD-10-PCS | Mod: S$GLB,,, | Performed by: DIETITIAN, REGISTERED

## 2023-09-12 PROCEDURE — 83036 HEMOGLOBIN GLYCOSYLATED A1C: CPT | Performed by: INTERNAL MEDICINE

## 2023-09-12 PROCEDURE — 3074F SYST BP LT 130 MM HG: CPT | Mod: CPTII,S$GLB,, | Performed by: INTERNAL MEDICINE

## 2023-09-12 PROCEDURE — 99999 PR PBB SHADOW E&M-EST. PATIENT-LVL I: CPT | Mod: PBBFAC,,,

## 2023-09-12 RX ORDER — BUSPIRONE HYDROCHLORIDE 5 MG/1
TABLET ORAL
Qty: 30 TABLET | Refills: 1 | Status: SHIPPED | OUTPATIENT
Start: 2023-09-12 | End: 2023-10-17 | Stop reason: SDUPTHER

## 2023-09-12 NOTE — PROGRESS NOTES
"Nutrition Assessment  Session Time:  60 minutes      Client name:  Will Maldonado  :  1995  Age:  28 y.o.  Gender:  male    Client states:  Very pleasant [employer] employee here for his annual Executive Health physical.  Has no active medical complaints today although shares history of borderline hyperlipidemia, which he believes may be genetic.  Takes no routine rx medications daily.  Maintains an active lifestyle, performing~45 minutes of weight training + 20 minutes sauna ~4x weekly.  Wishes to improve lower body muscle mass.  Lost 15# over the past year although regained 5# likely due to increased LBM.  Is conscious of PO intake throughout the work week, sharing that his meal pattern is very consistent then in contrast to the weekends, when he allows dietary indulgences (ETOH and dining out).  Enjoys hunting and fishing.  During work week, consumes three meals daily.  Typically has dinner leftovers for lunch inclusive of 3 "servings/portions" of lean protein (chicken thighs, trout, etc.), sweet potato or white rice, and a non-starchy vegetable.  Favors white rice, encompassing ~1/2 of his plate.  Will also have a protein bar (10 gm PRO) following lunch as he feels protein to be satiating.  May also snack on a small handful of chocolate chips mid-afternoon.  Inquired about recommended protein intake as he understood 1 gm/lb. body weight to be adequate.  Estimated intake averages >165 gm protein/day.  Also, inquired about protein supplements as he currently uses 1 scoop of whey and 1 tsp of creatine daily.  Experiences muscle cramping/tingling sensation on weekends and so, wonders if fluid or creatine induced.  Drinks mostly coffee and water, limiting ETOH intake to  and  primarily.  Drinks 4 Yeti tumblers of water during work yet not as much on the weekends.  Overall, had several specific nutrition-related questions, which were all answered.  Expressed gratitude for nutrition education " and recommendations received.     Anthropometrics  Height:  6'     Weight:  180.5#  BMI:  24.5  % Body Fat:  12.8%    Clinical Signs/Symptoms  N/V/D:  None  Appetite:  good       No past medical history on file.    Past Surgical History:   Procedure Laterality Date    lipoma removal  01/2021    right forehead       Medications    has a current medication list which includes the following prescription(s): buspirone, cetirizine, fluticasone propionate, and triamcinolone acetonide 0.5%.    Vitamins, Minerals, and/or Supplements:  Whey protein powder, creatine      Food/Medication Interactions:  Reviewed     Food Allergies or Intolerances:  NKFA     Social History    Marital status:    Employment:  [employer])    Social History     Tobacco Use    Smoking status: Never    Smokeless tobacco: Never   Substance Use Topics    Alcohol use: Yes     Comment: socially, on the weekends. about 5 drinks/week.         Lab Reports   Sodium   Date Value Ref Range Status   09/12/2023 142 136 - 145 mmol/L Final     Potassium   Date Value Ref Range Status   09/12/2023 4.7 3.5 - 5.1 mmol/L Final     Chloride   Date Value Ref Range Status   09/12/2023 105 95 - 110 mmol/L Final     CO2   Date Value Ref Range Status   09/12/2023 25 23 - 29 mmol/L Final     Glucose   Date Value Ref Range Status   09/12/2023 87 70 - 110 mg/dL Final     BUN   Date Value Ref Range Status   09/12/2023 19 6 - 20 mg/dL Final     Creatinine   Date Value Ref Range Status   09/12/2023 1.3 0.5 - 1.4 mg/dL Final     Calcium   Date Value Ref Range Status   09/12/2023 9.9 8.7 - 10.5 mg/dL Final     Total Protein   Date Value Ref Range Status   09/12/2023 7.9 6.0 - 8.4 g/dL Final     Albumin   Date Value Ref Range Status   09/12/2023 4.5 3.5 - 5.2 g/dL Final     Total Bilirubin   Date Value Ref Range Status   09/12/2023 0.8 0.1 - 1.0 mg/dL Final     Comment:     For infants and newborns, interpretation of results should be based  on gestational age, weight and in  agreement with clinical  observations.    Premature Infant recommended reference ranges:  Up to 24 hours.............<8.0 mg/dL  Up to 48 hours............<12.0 mg/dL  3-5 days..................<15.0 mg/dL  6-29 days.................<15.0 mg/dL       Alkaline Phosphatase   Date Value Ref Range Status   09/12/2023 62 55 - 135 U/L Final     AST   Date Value Ref Range Status   09/12/2023 39 10 - 40 U/L Final     ALT   Date Value Ref Range Status   09/12/2023 40 10 - 44 U/L Final     Anion Gap   Date Value Ref Range Status   09/12/2023 12 8 - 16 mmol/L Final     eGFR if    Date Value Ref Range Status   12/01/2021 >60.0 >60 mL/min/1.73 m^2 Final     eGFR if non    Date Value Ref Range Status   12/01/2021 >60.0 >60 mL/min/1.73 m^2 Final     Comment:     Calculation used to obtain the estimated glomerular filtration  rate (eGFR) is the CKD-EPI equation.         Lab Results   Component Value Date    WBC 5.92 09/12/2023    HGB 15.9 09/12/2023    HCT 47.6 09/12/2023    MCV 91 09/12/2023     09/12/2023        Lab Results   Component Value Date    CHOL 221 (H) 09/12/2023     Lab Results   Component Value Date    HDL 56 09/12/2023     Lab Results   Component Value Date    LDLCALC 143.0 09/12/2023     Lab Results   Component Value Date    TRIG 110 09/12/2023     Lab Results   Component Value Date    CHOLHDL 25.3 09/12/2023     Lab Results   Component Value Date    HGBA1C 5.2 09/12/2023     BP Readings from Last 1 Encounters:   09/12/23 122/86       Food History  Breakfast:  Overnight oats + blueberries + 1-1.5 cups 2% or skim milk + 1 scoop protein powder (24 gm PRO)  Mid-morning Snack:  None  Lunch:  3 chicken thighs + sweet potato or white rice (1/2 plate) + asparagus + protein bar (10 gm PRO)  Mid-afternoon Snack:  Handful chocolate chips  Dinner:  Same as lunch above  Snack:  +/- chocolate   *Fluid intake:  Water, coffee, ETOH (Friday & Saturday mostly)    Exercise History:  45 minutes  "weight training + 20 minutes sauna 4x/week + soccer/jogs every other week    Cultural/Spiritual/Personal Preferences:  None identified    Support System:  spouse and friends    State of Change:  Contemplation    Barriers to Change:  None    Diagnosis    Altered nutrition related laboratory values related to increased cholesterol and/or saturated fat intake as evidenced by LDL:  143.    Intervention    RMR (Method:  InBody):  1913 kcal  Activity Factor:  1.4    CHANTEL:  2678 kcal    Goals:  1.  LDL < 130  2.  Consider switching to low fat or nonfat dairy products, such as skim or 1% milk  3.  Consider switching whey protein powder to 365 brand whey protein or other low cholesterol alternative   4.  Reduce protein intake to 2 "servings/portions" with lunch and dinner, limiting intake to no more than 164 gm/day (2 gm/kg)  5.  Increase vegetable intake to 1/2 plate 2x daily  6.  Consider trial of whole grain/brown rice; limit intake of white rice to 1/4 plate with meals  7.  Consider moving protein bar from lunch to mid-afternoon snack  8.  Maintain > 150 minutes of physical activity/week as tolerated  9.  Aim for 90 fluid oz (water) daily distributed evenly throughout the day    Nutrition Education  The following education was provided to the patient:  Complimented patient on proactive role in health maintenance.  Complimented patient on physical activity efforts.  Discussed meal planning/Tomorrow's My Plate design.  Discussed healthy snacking.   Discussed weight management.  Discussed Heart Healthy Eating.  Suggested dietary modifications based on current dietary behaviors and individual food preferences.  Discussed macronutrient distribution among meals and snacks, including CHO, protein, and fat recommendations and its importance/benefits.  Discussed physical activity guidelines and its associated benefits.  Discussed nutrition-related lab values and dietary and/or lifestyle factors affecting them.  Discussed alcohol " consumption (potential health consequences of excessive alcohol intake, recommended intake for men and women, recommended serving sizes, ways to reduce and/or moderate intake, etc.).  Discussed recommended servings of non-starchy vegetables/day and food sources of such.  Discussed recommended fiber intake and food sources of such.  Discussed benefits of adequate hydration and recommended fluid intake.  Discussed post-workout nutrition.    Patient verbalized understanding of nutrition education and recommendations received.    Handouts Provided  Meal Planning Guide  Eat Fit Shopping List  Eat Fit Aliyah  Fueling Well On-The-Go    Monitoring/Evaluation    Monitor the following:  Weight  BMI  % Body Fat  Caloric intake  Lipid panel    Follow Up Plan:  Communication with referring healthcare provider is unnecessary at this time as patient presented as part of annual wellness exam.  However, will follow up with patient in 1-2 years.

## 2023-09-12 NOTE — PROGRESS NOTES
Pt. Has no significant cardiovascular or pulmonary history.    Physical Limitations:  Patient noted that he strained both hamstrings in June playing rugby and soccer but has since recovered.  He noted some prior tingling and weakness in his right arm but denied any current limitations to physical activity.      Current exercise routine:  Patient currently practices upper body and core resistance training exercises, 3-4 days a week.  Patient runs or plays soccer for 30 minutes, once every other week.  Patient stretches before and after exercising.    Goals:  Patient would like to build some lower body muscle mass.    Notes:  Patient was very friendly and engaged.  He noted that he lost around 15 lbs last year due to a stressful life event.  He is happy with his current routine but plans to add lower body resistance training back in once he returns from his upcoming trip to [place].  Patient asked questions and was receptive to all recommendations made.      The fitness evaluation results are as follows:    D.O.S. 9/12/2023 8/26/2022   Height (in): 72 72   Weight (lbs): 180.5 187.1   BMI: 24.299414 25.854666   Body Fat (%): 12.80 16.80   Waist (cm): 83 83   RBP (mmHg): 100/74 120/72   RHR (bpm): 58 56    Strength Dominant (Lbs): 151 155    Strength Non Dominant (Lbs): 140 140   Push-up Assessment: 40 36   Curl-up Assessment: 75 75   Flexibility Testing (cm): 37 31   REE (kcals): 1913 1895       Age/gender stratified assessment:    Resting BP: Within Normal Limits   Body Fat %: Excellent   Waist Circumfernece: Low Risk    Strength Dominant: 90th    Strength Non Dominant: 90th   Upper Body Endurance: Excellent   Abdominal Endurance: Well Above Average   Lower body Flexibiltiy: Very Good       Recommended fitness guidelines:    -150 minutes of moderate intensity aerobic exercise per week or 75 minutes of vigorous intensity aerobic exercise per week.  Add days, time, and intensity to your current aerobic  routine to reach the above guidelines.      -2 to 4 days per week of resistance training for each muscle group.  Add two days of lower body resistance training to your current exercise routine.      -Daily stretching with a hold of at least 30 seconds per muscle group.

## 2023-09-12 NOTE — PROGRESS NOTES
Subjective:       Patient ID: Will Maldonado is a 28 y.o. male.    Chief Complaint: Executive Health, Anxiety, and Cerumen Impaction    Patient here for executive health physical exam, new to me.  Overall doing well except for some mild anxiety.  He went through a stressful divorce thinks he is getting better after talking with a counselor.  Does not want to take a long med but would consider something just before flying.  We talked about potential drowsiness and he expressed understanding he would like to give it a try.    He also has 2 periodically wear custom ear plugs for work and when they went to fit him this week they noted some wax in the right ear.  Will take a look.  EKG stable   Labs show mild elevated cholesterol.  This has been up  TSH was 4.1 but normal free T4.  We spent several moments discussing this.  He has no hair loss constipation or weight gain other than that associated with the stress of the divorce.    He thinks there may be family history of thyroid in his mom but he will let me know after conferring with her.    I suggest a repeat TSH in a few months either with me or his PCP    Review of Systems   Constitutional:  Negative for chills, fatigue and fever.   HENT:  Negative for nosebleeds and trouble swallowing.    Eyes:  Negative for pain and visual disturbance.   Respiratory:  Negative for cough, shortness of breath and wheezing.    Cardiovascular:  Negative for chest pain and palpitations.   Gastrointestinal:  Negative for abdominal pain, constipation, diarrhea, nausea and vomiting.   Genitourinary:  Negative for difficulty urinating and hematuria.   Musculoskeletal:  Negative for arthralgias, back pain and neck pain.   Integumentary:  Negative for rash.   Neurological:  Negative for dizziness and headaches.   Hematological:  Does not bruise/bleed easily.   Psychiatric/Behavioral:  Negative for dysphoric mood and sleep disturbance. The patient is nervous/anxious (primarily associated  with flying).            History reviewed. No pertinent past medical history.  Past Surgical History:   Procedure Laterality Date    lipoma removal  01/2021    right forehead      Patient Active Problem List   Diagnosis    Environmental allergies    BMI 26.0-26.9,adult    Sebaceous cyst    History of anal fissures        Objective:      Physical Exam  Constitutional:       General: He is not in acute distress.     Appearance: He is well-developed.   HENT:      Head: Normocephalic and atraumatic.      Right Ear: Tympanic membrane, ear canal and external ear normal. There is impacted cerumen (removed under direct visualization with total clearing.).      Left Ear: Tympanic membrane, ear canal and external ear normal.      Mouth/Throat:      Pharynx: No oropharyngeal exudate or posterior oropharyngeal erythema.   Eyes:      General: No scleral icterus.     Conjunctiva/sclera: Conjunctivae normal.      Pupils: Pupils are equal, round, and reactive to light.   Neck:      Thyroid: No thyromegaly.      Comments: No supraclavicular nodes palpated  No thyroid masses or nodules noted  Cardiovascular:      Rate and Rhythm: Normal rate and regular rhythm.      Pulses: Normal pulses.      Heart sounds: Normal heart sounds. No murmur heard.  Pulmonary:      Effort: Pulmonary effort is normal.      Breath sounds: Normal breath sounds. No wheezing.   Abdominal:      General: Bowel sounds are normal.      Palpations: Abdomen is soft. There is no mass.      Tenderness: There is no abdominal tenderness.   Musculoskeletal:         General: No tenderness.      Cervical back: Normal range of motion and neck supple.      Right lower leg: No edema.      Left lower leg: No edema.   Lymphadenopathy:      Cervical: No cervical adenopathy.   Skin:     Coloration: Skin is not jaundiced or pale.   Neurological:      General: No focal deficit present.      Mental Status: He is alert and oriented to person, place, and time.   Psychiatric:     "     Mood and Affect: Mood normal.         Behavior: Behavior normal.       Assessment:       Problem List Items Addressed This Visit    None  Visit Diagnoses       Routine physical examination    -  Primary    Situational anxiety        Relevant Medications    busPIRone (BUSPAR) 5 MG Tab    Hyperlipidemia, unspecified hyperlipidemia type        Abnormal TSH        FT4 is normal, rec repeat in 6 months    Impacted cerumen of right ear                Plan:         Will was seen today for executive health, anxiety and cerumen impaction.    Diagnoses and all orders for this visit:    Routine physical examination    Situational anxiety  -     busPIRone (BUSPAR) 5 MG Tab; 1 by mouth prior to flying or for acute anxiety.    Hyperlipidemia, unspecified hyperlipidemia type    Abnormal TSH  Comments:  FT4 is normal, rec repeat in 6 months    Impacted cerumen of right ear                     Portions of this note may have been created with voice recognition software. Occasional "wrong-word" or "sound-a-like" substitutions may have occurred due to the inherent limitations of voice recognition software. Please, read the note carefully and recognize, using context, where substitutions have occurred.  "

## 2023-09-12 NOTE — LETTER
September 12, 2023    Will Maldonado  [address] [facility] [address]             [facility]  [address]  [facility] [address]  Phone: [phone]  Fax: [phone] Dear Mr. Maldonado:        Thank you for allowing me to serve you and perform your Executive Health exam on 9/12/2023.  This letter will serve a brief summary of the history, physical findings, and laboratory/studies performed and recommendations at that time.    Reason for Visit: Executive Health Preventive Physical Examination    Subjective:       Patient ID: Will Maldonado is a 28 y.o. male.    Chief Complaint: Executive Health, Anxiety, and Cerumen Impaction    Patient here for executive health physical exam, new to me.  Right mild anxiety associated.  He also went through a divorce stressful thinks he is getting better talking.  Does not want to take a long med but would consider something just before flying.  We talked about potential drowsiness and he expressed understanding he would like to give it a try.    He also has 2 periodically wear custom ear plugs for work and when they went to fit him this week they noted some wax in the right ear.  Will take a look.  EKG stable   Labs show mild elevated cholesterol.  This has been up  TSH was 4.1 but normal free T4.  We spent several moments discussing this.  He has no hair loss constipation or weight gain other than that associated with the stress of the divorce.    He thinks there may be family history of thyroid in his mom but he will let me know after conferring with her.    I suggest a repeat TSH in a few months either with me or his PCP      Review of Systems   Constitutional:  Negative for chills, fatigue and fever.   HENT:  Negative for nosebleeds and trouble swallowing.    Eyes:  Negative for pain and visual disturbance.   Respiratory:  Negative for cough, shortness of breath and wheezing.    Cardiovascular:  Negative for chest pain  and palpitations.   Gastrointestinal:  Negative for abdominal pain, constipation, diarrhea, nausea and vomiting.   Genitourinary:  Negative for difficulty urinating and hematuria.   Musculoskeletal:  Negative for arthralgias, back pain and neck pain.   Integumentary:  Negative for rash.   Neurological:  Negative for dizziness and headaches.   Hematological:  Does not bruise/bleed easily.   Psychiatric/Behavioral:  Negative for dysphoric mood and sleep disturbance. The patient is nervous/anxious (primarily associated with flying).            History reviewed. No pertinent past medical history.  Past Surgical History:   Procedure Laterality Date    lipoma removal  01/2021    right forehead      Patient Active Problem List   Diagnosis    Environmental allergies    BMI 26.0-26.9,adult    Sebaceous cyst    History of anal fissures        Objective:      Physical Exam  Constitutional:       General: He is not in acute distress.     Appearance: He is well-developed.   HENT:      Head: Normocephalic and atraumatic.      Right Ear: Tympanic membrane, ear canal and external ear normal. There is impacted cerumen (removed under direct visualization with total clearing.).      Left Ear: Tympanic membrane, ear canal and external ear normal.      Mouth/Throat:      Pharynx: No oropharyngeal exudate or posterior oropharyngeal erythema.   Eyes:      General: No scleral icterus.     Conjunctiva/sclera: Conjunctivae normal.      Pupils: Pupils are equal, round, and reactive to light.   Neck:      Thyroid: No thyromegaly.      Comments: No supraclavicular nodes palpated  No thyroid masses or nodules noted  Cardiovascular:      Rate and Rhythm: Normal rate and regular rhythm.      Pulses: Normal pulses.      Heart sounds: Normal heart sounds. No murmur heard.  Pulmonary:      Effort: Pulmonary effort is normal.      Breath sounds: Normal breath sounds. No wheezing.   Abdominal:      General: Bowel sounds are normal.       "Palpations: Abdomen is soft. There is no mass.      Tenderness: There is no abdominal tenderness.   Musculoskeletal:         General: No tenderness.      Cervical back: Normal range of motion and neck supple.      Right lower leg: No edema.      Left lower leg: No edema.   Lymphadenopathy:      Cervical: No cervical adenopathy.   Skin:     Coloration: Skin is not jaundiced or pale.   Neurological:      General: No focal deficit present.      Mental Status: He is alert and oriented to person, place, and time.   Psychiatric:         Mood and Affect: Mood normal.         Behavior: Behavior normal.         Assessment:       Problem List Items Addressed This Visit    None  Visit Diagnoses       Routine physical examination    -  Primary    Situational anxiety        Relevant Medications    busPIRone (BUSPAR) 5 MG Tab    Hyperlipidemia, unspecified hyperlipidemia type        Abnormal TSH        FT4 is normal, rec repeat in 6 months    Impacted cerumen of right ear                Plan:         Will was seen today for executive health, anxiety and cerumen impaction.    Diagnoses and all orders for this visit:    Routine physical examination    Situational anxiety  -     busPIRone (BUSPAR) 5 MG Tab; 1 by mouth prior to flying or for acute anxiety.    Hyperlipidemia, unspecified hyperlipidemia type    Abnormal TSH  Comments:  FT4 is normal, rec repeat in 6 months    Impacted cerumen of right ear                     Portions of this note may have been created with voice recognition software. Occasional "wrong-word" or "sound-a-like" substitutions may have occurred due to the inherent limitations of voice recognition software. Please, read the note carefully and recognize, using context, where substitutions have occurred.       Labs:  Results for orders placed or performed in visit on 09/12/23   Comprehensive metabolic panel   Result Value Ref Range    Sodium 142 136 - 145 mmol/L    Potassium 4.7 3.5 - 5.1 mmol/L    Chloride " 105 95 - 110 mmol/L    CO2 25 23 - 29 mmol/L    Glucose 87 70 - 110 mg/dL    BUN 19 6 - 20 mg/dL    Creatinine 1.3 0.5 - 1.4 mg/dL    Calcium 9.9 8.7 - 10.5 mg/dL    Total Protein 7.9 6.0 - 8.4 g/dL    Albumin 4.5 3.5 - 5.2 g/dL    Total Bilirubin 0.8 0.1 - 1.0 mg/dL    Alkaline Phosphatase 62 55 - 135 U/L    AST 39 10 - 40 U/L    ALT 40 10 - 44 U/L    eGFR >60.0 >60 mL/min/1.73 m^2    Anion Gap 12 8 - 16 mmol/L   CBC Without Differential   Result Value Ref Range    WBC 5.92 3.90 - 12.70 K/uL    RBC 5.23 4.60 - 6.20 M/uL    Hemoglobin 15.9 14.0 - 18.0 g/dL    Hematocrit 47.6 40.0 - 54.0 %    MCV 91 82 - 98 fL    MCH 30.4 27.0 - 31.0 pg    MCHC 33.4 32.0 - 36.0 g/dL    RDW 12.2 11.5 - 14.5 %    Platelets 242 150 - 450 K/uL    MPV 10.1 9.2 - 12.9 fL   Lipid panel   Result Value Ref Range    Cholesterol 221 (H) 120 - 199 mg/dL    Triglycerides 110 30 - 150 mg/dL    HDL 56 40 - 75 mg/dL    LDL Cholesterol 143.0 63.0 - 159.0 mg/dL    HDL/Cholesterol Ratio 25.3 20.0 - 50.0 %    Total Cholesterol/HDL Ratio 3.9 2.0 - 5.0    Non-HDL Cholesterol 165 mg/dL   Hemoglobin A1c   Result Value Ref Range    Hemoglobin A1C 5.2 4.0 - 5.6 %    Estimated Avg Glucose 103 68 - 131 mg/dL   TSH   Result Value Ref Range    TSH 4.124 (H) 0.400 - 4.000 uIU/mL   T4, Free   Result Value Ref Range    Free T4 1.02 0.71 - 1.51 ng/dL        Assessment/Recommendations:  Routine Health Maintenance    At this time, you appear to be in good medical condition.  I look forward to seeing you again next year.  Please contact me should you have any questions or concerns regarding physical findings, or my recommendations.              If you have any questions or concerns, please don't hesitate to call.    Sincerely,        Mario Negrete MD

## 2023-10-06 DIAGNOSIS — F41.8 SITUATIONAL ANXIETY: ICD-10-CM

## 2023-10-06 NOTE — TELEPHONE ENCOUNTER
No care due was identified.  [facility] Embedded Care Due Messages. Reference number: [id number].   10/06/2023 4:37:14 PM CDT

## 2023-10-07 RX ORDER — BUSPIRONE HYDROCHLORIDE 5 MG/1
TABLET ORAL
Qty: 30 TABLET | Refills: 1 | OUTPATIENT
Start: 2023-10-07

## 2023-10-16 ENCOUNTER — PATIENT MESSAGE (OUTPATIENT)
Dept: FAMILY MEDICINE | Facility: CLINIC | Age: 28
End: 2023-10-16
Payer: COMMERCIAL

## 2023-10-17 ENCOUNTER — OFFICE VISIT (OUTPATIENT)
Dept: FAMILY MEDICINE | Facility: CLINIC | Age: 28
End: 2023-10-17
Payer: COMMERCIAL

## 2023-10-17 VITALS
DIASTOLIC BLOOD PRESSURE: 80 MMHG | HEIGHT: 73 IN | TEMPERATURE: 98 F | SYSTOLIC BLOOD PRESSURE: 134 MMHG | HEART RATE: 76 BPM | BODY MASS INDEX: 24.45 KG/M2 | OXYGEN SATURATION: 99 % | WEIGHT: 184.5 LBS

## 2023-10-17 DIAGNOSIS — E78.00 HIGH CHOLESTEROL: ICD-10-CM

## 2023-10-17 DIAGNOSIS — E03.8 SUBCLINICAL HYPOTHYROIDISM: ICD-10-CM

## 2023-10-17 DIAGNOSIS — F41.9 ANXIETY: Primary | ICD-10-CM

## 2023-10-17 PROCEDURE — 1159F MED LIST DOCD IN RCRD: CPT | Mod: CPTII,S$GLB,, | Performed by: FAMILY MEDICINE

## 2023-10-17 PROCEDURE — 3079F PR MOST RECENT DIASTOLIC BLOOD PRESSURE 80-89 MM HG: ICD-10-PCS | Mod: CPTII,S$GLB,, | Performed by: FAMILY MEDICINE

## 2023-10-17 PROCEDURE — 3008F BODY MASS INDEX DOCD: CPT | Mod: CPTII,S$GLB,, | Performed by: FAMILY MEDICINE

## 2023-10-17 PROCEDURE — 99999 PR PBB SHADOW E&M-EST. PATIENT-LVL IV: CPT | Mod: PBBFAC,,, | Performed by: FAMILY MEDICINE

## 2023-10-17 PROCEDURE — 1160F PR REVIEW ALL MEDS BY PRESCRIBER/CLIN PHARMACIST DOCUMENTED: ICD-10-PCS | Mod: CPTII,S$GLB,, | Performed by: FAMILY MEDICINE

## 2023-10-17 PROCEDURE — 99999 PR PBB SHADOW E&M-EST. PATIENT-LVL IV: ICD-10-PCS | Mod: PBBFAC,,, | Performed by: FAMILY MEDICINE

## 2023-10-17 PROCEDURE — 3044F HG A1C LEVEL LT 7.0%: CPT | Mod: CPTII,S$GLB,, | Performed by: FAMILY MEDICINE

## 2023-10-17 PROCEDURE — 3044F PR MOST RECENT HEMOGLOBIN A1C LEVEL <7.0%: ICD-10-PCS | Mod: CPTII,S$GLB,, | Performed by: FAMILY MEDICINE

## 2023-10-17 PROCEDURE — 1160F RVW MEDS BY RX/DR IN RCRD: CPT | Mod: CPTII,S$GLB,, | Performed by: FAMILY MEDICINE

## 2023-10-17 PROCEDURE — 99214 PR OFFICE/OUTPT VISIT, EST, LEVL IV, 30-39 MIN: ICD-10-PCS | Mod: S$GLB,,, | Performed by: FAMILY MEDICINE

## 2023-10-17 PROCEDURE — 1159F PR MEDICATION LIST DOCUMENTED IN MEDICAL RECORD: ICD-10-PCS | Mod: CPTII,S$GLB,, | Performed by: FAMILY MEDICINE

## 2023-10-17 PROCEDURE — 3079F DIAST BP 80-89 MM HG: CPT | Mod: CPTII,S$GLB,, | Performed by: FAMILY MEDICINE

## 2023-10-17 PROCEDURE — 3075F PR MOST RECENT SYSTOLIC BLOOD PRESS GE 130-139MM HG: ICD-10-PCS | Mod: CPTII,S$GLB,, | Performed by: FAMILY MEDICINE

## 2023-10-17 PROCEDURE — 3075F SYST BP GE 130 - 139MM HG: CPT | Mod: CPTII,S$GLB,, | Performed by: FAMILY MEDICINE

## 2023-10-17 PROCEDURE — 3008F PR BODY MASS INDEX (BMI) DOCUMENTED: ICD-10-PCS | Mod: CPTII,S$GLB,, | Performed by: FAMILY MEDICINE

## 2023-10-17 PROCEDURE — 99214 OFFICE O/P EST MOD 30 MIN: CPT | Mod: S$GLB,,, | Performed by: FAMILY MEDICINE

## 2023-10-17 RX ORDER — BUSPIRONE HYDROCHLORIDE 5 MG/1
5 TABLET ORAL 2 TIMES DAILY
Qty: 180 TABLET | Refills: 0 | Status: SHIPPED | OUTPATIENT
Start: 2023-10-17 | End: 2024-01-18

## 2023-10-17 NOTE — PATIENT INSTRUCTIONS
"Start buspar nightly x 7 days  Increase to BID after  Update me in 3 weeks  Can increase dose and/or add SSRI  F/u 3 months.     F/u with therapist    Labs prior to apt    Try to adopt a Plant Based, whole food (unprocessed) dietary strategy.   Consider watching the movie "[employer]" as well as visiting [email or web address] for additional resources.   You can also read the book "Prevent and Reverse Heart Disease" by Dr. Vasquez.    LDL - bad type - improves with diet and meds. (typically statins)             May not improve significantly with exercise alone             Ideal range 60 -100         HDL - good type - improves with exercise             Ideal range 50 - 65    TGs (triglycerides) - also bad - can change very quickly and considerably with food - improves with diet and exercise            In some cases a low carbohydrate diet will lower TGs better than a low fat diet.            Ideal range     Fat and cholesterol in food:    Most foods that are high in cholesterol are also high in saturated fat. Saturated fat is the bad fat - you should limit your intake of that. Deep fried foods, meats and other animal fats are high in saturated fat. Cookies and most dessert and cakes are usually high in saturated fat and sugar.      Unsaturated fat is the good fat. The Mediterranean style diet encourages the intake of unsaturated fat - olive oil, avocado and unsalted nuts.    Do not use stick butter or stick margarine. Butter that comes in a tub or margarine such as Benecol is OK to use.    Trans fats should also be avoided. Most foods that are labelled as containing 0 gms of trans fat can still contain several hundred milligrams of trans fat: creamer, margarine, refrigerator dough, deep fried foods, ready made frosting, potato, corn and torilla chips, cakes, cookies, pie crusts and crackers containing shortening made with hydrogenated vegetable oil.    "

## 2023-10-17 NOTE — PROGRESS NOTES
"Subjective:       Patient ID: Will Maldonado is a 28 y.o. male.    Chief Complaint: Follow-up    Will is a 28 y.o. male who presents today for f/u    Was last seen 2 years ago. Since then has gone through a divorce, is living on his own, changed jobs,  and has increased anxiety. Was seeing a therapist and identified triggers for symptoms but stopped in May. Exercises daily. Today GAGANDEEP-7 score of 13 and PHQ-9 score of   Reports restlessness if chief symptom and decreased interest in hobbies. Goes out most Saturdays with friends and had at least 8 drinks, a mix of beer and liquor.    In august 2022, "she left." Divorce finalized June 1st 2023.     Social: Lives by himself with 3.5 year old dog. Has good support system in [place].       Review of Systems   Constitutional:  Negative for fatigue.   Respiratory:  Negative for shortness of breath.    Cardiovascular:  Negative for chest pain and palpitations.   Gastrointestinal:  Negative for abdominal pain, constipation, diarrhea and nausea.   Psychiatric/Behavioral:  Negative for hallucinations, self-injury, sleep disturbance and suicidal ideas. The patient is nervous/anxious.              Results for orders placed or performed in visit on 09/12/23   Comprehensive metabolic panel   Result Value Ref Range    Sodium 142 136 - 145 mmol/L    Potassium 4.7 3.5 - 5.1 mmol/L    Chloride 105 95 - 110 mmol/L    CO2 25 23 - 29 mmol/L    Glucose 87 70 - 110 mg/dL    BUN 19 6 - 20 mg/dL    Creatinine 1.3 0.5 - 1.4 mg/dL    Calcium 9.9 8.7 - 10.5 mg/dL    Total Protein 7.9 6.0 - 8.4 g/dL    Albumin 4.5 3.5 - 5.2 g/dL    Total Bilirubin 0.8 0.1 - 1.0 mg/dL    Alkaline Phosphatase 62 55 - 135 U/L    AST 39 10 - 40 U/L    ALT 40 10 - 44 U/L    eGFR >60.0 >60 mL/min/1.73 m^2    Anion Gap 12 8 - 16 mmol/L   CBC Without Differential   Result Value Ref Range    WBC 5.92 3.90 - 12.70 K/uL    RBC 5.23 4.60 - 6.20 M/uL    Hemoglobin 15.9 14.0 - 18.0 g/dL    Hematocrit 47.6 40.0 - 54.0 " "%    MCV 91 82 - 98 fL    MCH 30.4 27.0 - 31.0 pg    MCHC 33.4 32.0 - 36.0 g/dL    RDW 12.2 11.5 - 14.5 %    Platelets 242 150 - 450 K/uL    MPV 10.1 9.2 - 12.9 fL   Lipid panel   Result Value Ref Range    Cholesterol 221 (H) 120 - 199 mg/dL    Triglycerides 110 30 - 150 mg/dL    HDL 56 40 - 75 mg/dL    LDL Cholesterol 143.0 63.0 - 159.0 mg/dL    HDL/Cholesterol Ratio 25.3 20.0 - 50.0 %    Total Cholesterol/HDL Ratio 3.9 2.0 - 5.0    Non-HDL Cholesterol 165 mg/dL   Hemoglobin A1c   Result Value Ref Range    Hemoglobin A1C 5.2 4.0 - 5.6 %    Estimated Avg Glucose 103 68 - 131 mg/dL   TSH   Result Value Ref Range    TSH 4.124 (H) 0.400 - 4.000 uIU/mL   T4, Free   Result Value Ref Range    Free T4 1.02 0.71 - 1.51 ng/dL       Objective:     Vitals:    10/17/23 1042   BP: 134/80   BP Location: Left arm   Patient Position: Sitting   BP Method: Medium (Manual)   Pulse: 76   Temp: 98.1 °F (36.7 °C)   TempSrc: Oral   SpO2: 99%   Weight: 83.7 kg (184 lb 8.4 oz)   Height: 6' 1" (1.854 m)        Physical Exam  Constitutional:       General: He is not in acute distress.     Appearance: He is not ill-appearing, toxic-appearing or diaphoretic.   Cardiovascular:      Rate and Rhythm: Normal rate and regular rhythm.   Pulmonary:      Effort: Pulmonary effort is normal.      Breath sounds: Normal breath sounds.   Neurological:      Mental Status: He is alert.   Psychiatric:         Mood and Affect: Mood normal.         Behavior: Behavior normal.         Thought Content: Thought content normal.         Judgment: Judgment normal.         Assessment:       1. Anxiety    2. Subclinical hypothyroidism    3. High cholesterol    4. BMI 24.0-24.9, adult        Plan:       Start buspar nightly x 7 days  Increase to BID after  Update me in 3 weeks  Can increase dose and/or add SSRI  F/u 3 months.     F/u with therapist    Labs prior to apt    Anxiety  -     busPIRone (BUSPAR) 5 MG Tab; Take 1 tablet (5 mg total) by mouth 2 (two) times daily.  " Dispense: 180 tablet; Refill: 0    Subclinical hypothyroidism  -     T4, Free; Future; Expected date: 10/17/2023  -     TSH; Future; Expected date: 10/17/2023    High cholesterol  -     Lipid Panel; Future; Expected date: 10/17/2023    BMI 24.0-24.9, adult            Warning signs discussed, patient to call with any further issues or worsening of symptoms. Above note may have utilized dictation, please excuse any transcription errors.

## 2024-01-18 ENCOUNTER — TELEPHONE (OUTPATIENT)
Dept: FAMILY MEDICINE | Facility: CLINIC | Age: 29
End: 2024-01-18
Payer: COMMERCIAL

## 2024-01-18 DIAGNOSIS — F41.9 ANXIETY: ICD-10-CM

## 2024-01-18 RX ORDER — BUSPIRONE HYDROCHLORIDE 5 MG/1
5 TABLET ORAL 2 TIMES DAILY
Qty: 180 TABLET | Refills: 0 | Status: SHIPPED | OUTPATIENT
Start: 2024-01-18

## 2024-01-18 NOTE — TELEPHONE ENCOUNTER
Called patient to schedule an appointment last appointment cancel. Patient stated he's not interested in scheduling an appointment. Informed patient his medication will no longer be filled no more he needs to be seen. Patient stated he's going to think about it and give us a call if he wants to be seen.

## 2024-01-18 NOTE — TELEPHONE ENCOUNTER
No care due was identified.  [facility] Embedded Care Due Messages. Reference number: [id number].   1/18/2024 12:27:50 AM CST

## 2024-04-18 ENCOUNTER — PATIENT MESSAGE (OUTPATIENT)
Dept: FAMILY MEDICINE | Facility: CLINIC | Age: 29
End: 2024-04-18
Payer: COMMERCIAL

## 2024-05-31 DIAGNOSIS — Z00.00 ROUTINE MEDICAL EXAM: Primary | ICD-10-CM

## 2024-05-31 DIAGNOSIS — Z00.00 ROUTINE GENERAL MEDICAL EXAMINATION AT A HEALTH CARE FACILITY: Primary | ICD-10-CM

## 2024-08-12 ENCOUNTER — CLINICAL SUPPORT (OUTPATIENT)
Dept: INTERNAL MEDICINE | Facility: CLINIC | Age: 29
End: 2024-08-12
Payer: COMMERCIAL

## 2024-08-12 ENCOUNTER — HOSPITAL ENCOUNTER (OUTPATIENT)
Dept: CARDIOLOGY | Facility: CLINIC | Age: 29
Discharge: HOME | End: 2024-08-12
Payer: COMMERCIAL

## 2024-08-12 ENCOUNTER — OFFICE VISIT (OUTPATIENT)
Dept: INTERNAL MEDICINE | Facility: CLINIC | Age: 29
End: 2024-08-12
Payer: COMMERCIAL

## 2024-08-12 VITALS
DIASTOLIC BLOOD PRESSURE: 60 MMHG | BODY MASS INDEX: 26.38 KG/M2 | WEIGHT: 199.06 LBS | OXYGEN SATURATION: 98 % | HEIGHT: 73 IN | RESPIRATION RATE: 16 BRPM | SYSTOLIC BLOOD PRESSURE: 112 MMHG | HEART RATE: 68 BPM

## 2024-08-12 DIAGNOSIS — Z00.00 ROUTINE GENERAL MEDICAL EXAMINATION AT A HEALTH CARE FACILITY: Primary | ICD-10-CM

## 2024-08-12 DIAGNOSIS — F41.9 ANXIETY: ICD-10-CM

## 2024-08-12 DIAGNOSIS — Z00.00 ROUTINE PHYSICAL EXAMINATION: Primary | ICD-10-CM

## 2024-08-12 DIAGNOSIS — Z00.00 ROUTINE GENERAL MEDICAL EXAMINATION AT A HEALTH CARE FACILITY: ICD-10-CM

## 2024-08-12 LAB
ALBUMIN SERPL BCP-MCNC: 4.3 G/DL (ref 3.5–5.2)
ALP SERPL-CCNC: 61 U/L (ref 55–135)
ALT SERPL W/O P-5'-P-CCNC: 38 U/L (ref 10–44)
ANION GAP SERPL CALC-SCNC: 7 MMOL/L (ref 8–16)
AST SERPL-CCNC: 41 U/L (ref 10–40)
BILIRUB SERPL-MCNC: 0.9 MG/DL (ref 0.1–1)
BUN SERPL-MCNC: 20 MG/DL (ref 6–20)
CALCIUM SERPL-MCNC: 9.4 MG/DL (ref 8.7–10.5)
CHLORIDE SERPL-SCNC: 104 MMOL/L (ref 95–110)
CHOLEST SERPL-MCNC: 211 MG/DL (ref 120–199)
CHOLEST/HDLC SERPL: 4.8 {RATIO} (ref 2–5)
CO2 SERPL-SCNC: 26 MMOL/L (ref 23–29)
CREAT SERPL-MCNC: 1.2 MG/DL (ref 0.5–1.4)
ERYTHROCYTE [DISTWIDTH] IN BLOOD BY AUTOMATED COUNT: 12.3 % (ref 11.5–14.5)
EST. GFR  (NO RACE VARIABLE): >60 ML/MIN/1.73 M^2
ESTIMATED AVG GLUCOSE: 100 MG/DL (ref 68–131)
GLUCOSE SERPL-MCNC: 92 MG/DL (ref 70–110)
HBA1C MFR BLD: 5.1 % (ref 4–5.6)
HCT VFR BLD AUTO: 46.3 % (ref 40–54)
HDLC SERPL-MCNC: 44 MG/DL (ref 40–75)
HDLC SERPL: 20.9 % (ref 20–50)
HGB BLD-MCNC: 15.2 G/DL (ref 14–18)
LDLC SERPL CALC-MCNC: 137.2 MG/DL (ref 63–159)
MCH RBC QN AUTO: 30.3 PG (ref 27–31)
MCHC RBC AUTO-ENTMCNC: 32.8 G/DL (ref 32–36)
MCV RBC AUTO: 92 FL (ref 82–98)
NONHDLC SERPL-MCNC: 167 MG/DL
OHS QRS DURATION: 88 MS
OHS QTC CALCULATION: 379 MS
PLATELET # BLD AUTO: 240 K/UL (ref 150–450)
PMV BLD AUTO: 10.4 FL (ref 9.2–12.9)
POTASSIUM SERPL-SCNC: 4.4 MMOL/L (ref 3.5–5.1)
PROT SERPL-MCNC: 7.4 G/DL (ref 6–8.4)
RBC # BLD AUTO: 5.01 M/UL (ref 4.6–6.2)
SODIUM SERPL-SCNC: 137 MMOL/L (ref 136–145)
TRIGL SERPL-MCNC: 149 MG/DL (ref 30–150)
TSH SERPL DL<=0.005 MIU/L-ACNC: 1.17 UIU/ML (ref 0.4–4)
WBC # BLD AUTO: 4.82 K/UL (ref 3.9–12.7)

## 2024-08-12 PROCEDURE — 3044F HG A1C LEVEL LT 7.0%: CPT | Mod: CPTII,S$GLB,, | Performed by: INTERNAL MEDICINE

## 2024-08-12 PROCEDURE — 93005 ELECTROCARDIOGRAM TRACING: CPT | Mod: S$GLB,,, | Performed by: INTERNAL MEDICINE

## 2024-08-12 PROCEDURE — 93010 ELECTROCARDIOGRAM REPORT: CPT | Mod: S$GLB,,, | Performed by: INTERNAL MEDICINE

## 2024-08-12 PROCEDURE — 80061 LIPID PANEL: CPT | Performed by: INTERNAL MEDICINE

## 2024-08-12 PROCEDURE — 99999 PR PBB SHADOW E&M-EST. PATIENT-LVL I: CPT | Mod: PBBFAC,,,

## 2024-08-12 PROCEDURE — 97750 PHYSICAL PERFORMANCE TEST: CPT | Mod: GO,S$GLB,, | Performed by: INTERNAL MEDICINE

## 2024-08-12 PROCEDURE — 85027 COMPLETE CBC AUTOMATED: CPT | Performed by: INTERNAL MEDICINE

## 2024-08-12 PROCEDURE — 84443 ASSAY THYROID STIM HORMONE: CPT | Performed by: INTERNAL MEDICINE

## 2024-08-12 PROCEDURE — 97802 MEDICAL NUTRITION INDIV IN: CPT | Mod: S$GLB,,, | Performed by: DIETITIAN, REGISTERED

## 2024-08-12 PROCEDURE — 99999 PR PBB SHADOW E&M-EST. PATIENT-LVL II: CPT | Mod: PBBFAC,,,

## 2024-08-12 PROCEDURE — 83036 HEMOGLOBIN GLYCOSYLATED A1C: CPT | Performed by: INTERNAL MEDICINE

## 2024-08-12 PROCEDURE — 1160F RVW MEDS BY RX/DR IN RCRD: CPT | Mod: CPTII,S$GLB,, | Performed by: INTERNAL MEDICINE

## 2024-08-12 PROCEDURE — 99999 PR PBB SHADOW E&M-EST. PATIENT-LVL III: CPT | Mod: PBBFAC,,, | Performed by: INTERNAL MEDICINE

## 2024-08-12 PROCEDURE — 3008F BODY MASS INDEX DOCD: CPT | Mod: CPTII,S$GLB,, | Performed by: INTERNAL MEDICINE

## 2024-08-12 PROCEDURE — 3074F SYST BP LT 130 MM HG: CPT | Mod: CPTII,S$GLB,, | Performed by: INTERNAL MEDICINE

## 2024-08-12 PROCEDURE — 1159F MED LIST DOCD IN RCRD: CPT | Mod: CPTII,S$GLB,, | Performed by: INTERNAL MEDICINE

## 2024-08-12 PROCEDURE — 3078F DIAST BP <80 MM HG: CPT | Mod: CPTII,S$GLB,, | Performed by: INTERNAL MEDICINE

## 2024-08-12 PROCEDURE — 99395 PREV VISIT EST AGE 18-39: CPT | Mod: S$GLB,,, | Performed by: INTERNAL MEDICINE

## 2024-08-12 PROCEDURE — 80053 COMPREHEN METABOLIC PANEL: CPT | Performed by: INTERNAL MEDICINE

## 2024-08-12 NOTE — PATIENT INSTRUCTIONS
Goals:  1.  Aim to consume about 130 to 150 gms of protein every day. May consume <45 gm of protein per meal and ~10 to 15 gm per snack.   2.  Aim to consume about 128 oz (1 gallon) of water every day.  May increase intake of water if thirst persist after hydration goal is met but not to exceed 256 oz (2 gal) in a day   3.  Maintain hydration with water, however in the event of overexertion, may consume IV hydration or similar OTC electrolyte drink instead.   4.  Implement LDL Cholesterol-Lowering Nutrition Therapy in the presence of increase levels in Chol 221, , LDL Chol 137, when compared to previous results.   5.  May continue with Gold Standard Protein powder, discontinue use of cretin and casin. Trial other plant based food items such as hummas or edamame for afternoon snack.   6.  Aim to increase intake of plant sterols found in vegetables, fruits, wheat germ, whole grains, beans, lentils, sunflower seeds, and many vegetable oils.   7.  Check out myplate.gov for additional information on healthy eating.

## 2024-08-12 NOTE — LETTER
August 12, 2024    Will Maldonado  [address] [address]             [address] [facility]  [address]  [place] [address]  Phone: [phone]  Fax: [phone] Dear Mr. Maldonado:        Thank you for allowing me to serve you and perform your Executive Health exam on 8/12/2024.  This letter will serve a brief summary of the history, physical findings, and laboratory/studies performed and recommendations at that time.    Reason for Visit: Executive Health Preventive Physical Examination    Subjective:       Patient ID: Will Maldonado is a 29 y.o. male.    Chief Complaint: Annual Exam    HPI: Patient here for executive health physical.  Overall he says he is doing well.  Better than last year.  Anxiety is under better control.  No longer using medication for anxiety.  He said it is okay to keep it on his list in case he does needed in the future but is feeling much better.  No chest pain or shortness a breath.  No fevers or chills.  He actually was trying to gained a little muscle weight through exercise.    Labs all reviewed.  AST was 1 point above normal but he was drinking a few beers this weekend.  Other labs were stable and reviewed.  EKG showed bradycardia otherwise normal.    Family history stable.      Review of Systems   Constitutional:  Negative for chills, fatigue and fever.   HENT:  Negative for nosebleeds and trouble swallowing.    Eyes:  Negative for pain and visual disturbance.   Respiratory:  Negative for cough, shortness of breath and wheezing.    Cardiovascular:  Negative for chest pain and palpitations.   Gastrointestinal:  Negative for abdominal pain, constipation, diarrhea, nausea and vomiting.   Genitourinary:  Negative for difficulty urinating and hematuria.   Musculoskeletal:  Negative for arthralgias, back pain and neck pain.   Integumentary:  Negative for rash.   Neurological:  Negative for dizziness and headaches.    Hematological:  Does not bruise/bleed easily.   Psychiatric/Behavioral:  Negative for dysphoric mood and sleep disturbance. The patient is nervous/anxious (much improved in last year.).            History reviewed. No pertinent past medical history.  Past Surgical History:   Procedure Laterality Date    lipoma removal  01/2021    right forehead      Patient Active Problem List   Diagnosis    Environmental allergies    BMI 26.0-26.9,adult    BMI 24.0-24.9, adult    Sebaceous cyst    History of anal fissures    Subclinical hypothyroidism    Anxiety        Objective:      Physical Exam  Constitutional:       General: He is not in acute distress.     Appearance: He is well-developed.   HENT:      Head: Normocephalic and atraumatic.      Right Ear: Tympanic membrane, ear canal and external ear normal.      Left Ear: Tympanic membrane, ear canal and external ear normal.      Mouth/Throat:      Pharynx: No oropharyngeal exudate or posterior oropharyngeal erythema.   Eyes:      General: No scleral icterus.     Conjunctiva/sclera: Conjunctivae normal.      Pupils: Pupils are equal, round, and reactive to light.   Neck:      Thyroid: No thyromegaly.      Comments: No supraclavicular nodes palpated  Cardiovascular:      Rate and Rhythm: Normal rate and regular rhythm.      Pulses: Normal pulses.      Heart sounds: Normal heart sounds. No murmur heard.  Pulmonary:      Effort: Pulmonary effort is normal.      Breath sounds: Normal breath sounds. No wheezing.   Abdominal:      General: Bowel sounds are normal.      Palpations: Abdomen is soft. There is no mass.      Tenderness: There is no abdominal tenderness.   Musculoskeletal:         General: No tenderness.      Cervical back: Normal range of motion and neck supple.      Right lower leg: No edema.      Left lower leg: No edema.   Lymphadenopathy:      Cervical: No cervical adenopathy.   Skin:     Coloration: Skin is not jaundiced or pale.   Neurological:       "General: No focal deficit present.      Mental Status: He is alert and oriented to person, place, and time.   Psychiatric:         Mood and Affect: Mood normal.         Behavior: Behavior normal.         Assessment:       Problem List Items Addressed This Visit          Psychiatric    Anxiety     Other Visit Diagnoses       Routine physical examination    -  Primary            Plan:         Will was seen today for annual exam.    Diagnoses and all orders for this visit:    Routine physical examination    Anxiety  Comments:  Much improved compared to previous years.  Call with any changes.                     Portions of this note may have been created with voice recognition software. Occasional "wrong-word" or "sound-a-like" substitutions may have occurred due to the inherent limitations of voice recognition software. Please, read the note carefully and recognize, using context, where substitutions have occurred.       Labs:  Results for orders placed or performed in visit on 08/12/24   Comprehensive metabolic panel   Result Value Ref Range    Sodium 137 136 - 145 mmol/L    Potassium 4.4 3.5 - 5.1 mmol/L    Chloride 104 95 - 110 mmol/L    CO2 26 23 - 29 mmol/L    Glucose 92 70 - 110 mg/dL    BUN 20 6 - 20 mg/dL    Creatinine 1.2 0.5 - 1.4 mg/dL    Calcium 9.4 8.7 - 10.5 mg/dL    Total Protein 7.4 6.0 - 8.4 g/dL    Albumin 4.3 3.5 - 5.2 g/dL    Total Bilirubin 0.9 0.1 - 1.0 mg/dL    Alkaline Phosphatase 61 55 - 135 U/L    AST 41 (H) 10 - 40 U/L    ALT 38 10 - 44 U/L    eGFR >60.0 >60 mL/min/1.73 m^2    Anion Gap 7 (L) 8 - 16 mmol/L   CBC Without Differential   Result Value Ref Range    WBC 4.82 3.90 - 12.70 K/uL    RBC 5.01 4.60 - 6.20 M/uL    Hemoglobin 15.2 14.0 - 18.0 g/dL    Hematocrit 46.3 40.0 - 54.0 %    MCV 92 82 - 98 fL    MCH 30.3 27.0 - 31.0 pg    MCHC 32.8 32.0 - 36.0 g/dL    RDW 12.3 11.5 - 14.5 %    Platelets 240 150 - 450 K/uL    MPV 10.4 9.2 - 12.9 fL   Lipid panel   Result Value Ref Range    " Cholesterol 211 (H) 120 - 199 mg/dL    Triglycerides 149 30 - 150 mg/dL    HDL 44 40 - 75 mg/dL    LDL Cholesterol 137.2 63.0 - 159.0 mg/dL    HDL/Cholesterol Ratio 20.9 20.0 - 50.0 %    Total Cholesterol/HDL Ratio 4.8 2.0 - 5.0    Non-HDL Cholesterol 167 mg/dL   Hemoglobin A1c   Result Value Ref Range    Hemoglobin A1C 5.1 4.0 - 5.6 %    Estimated Avg Glucose 100 68 - 131 mg/dL   TSH   Result Value Ref Range    TSH 1.173 0.400 - 4.000 uIU/mL        Assessment/Recommendations:  Routine Health Maintenance    At this time, you appear to be in good medical condition.  I look forward to seeing you again next year.  Please contact me should you have any questions or concerns regarding physical findings, or my recommendations.              If you have any questions or concerns, please don't hesitate to call.    Sincerely,        Mario Negrete MD

## 2024-08-12 NOTE — PROGRESS NOTES
Subjective:       Patient ID: Will Maldonado is a 29 y.o. male.    Chief Complaint: Annual Exam    HPI: Patient here for executive health physical.  Overall he says he is doing well.  Better than last year.  Anxiety is under better control.  No longer using medication for anxiety.  He said it is okay to keep it on his list in case he does needed in the future but is feeling much better.  No chest pain or shortness a breath.  No fevers or chills.  He actually was trying to gained a little muscle weight through exercise.    Labs all reviewed.  AST was 1 point above normal but he was drinking a few beers this weekend.  Other labs were stable and reviewed.  EKG showed bradycardia otherwise normal.    Family history stable.      Review of Systems   Constitutional:  Negative for chills, fatigue and fever.   HENT:  Negative for nosebleeds and trouble swallowing.    Eyes:  Negative for pain and visual disturbance.   Respiratory:  Negative for cough, shortness of breath and wheezing.    Cardiovascular:  Negative for chest pain and palpitations.   Gastrointestinal:  Negative for abdominal pain, constipation, diarrhea, nausea and vomiting.   Genitourinary:  Negative for difficulty urinating and hematuria.   Musculoskeletal:  Negative for arthralgias, back pain and neck pain.   Integumentary:  Negative for rash.   Neurological:  Negative for dizziness and headaches.   Hematological:  Does not bruise/bleed easily.   Psychiatric/Behavioral:  Negative for dysphoric mood and sleep disturbance. The patient is nervous/anxious (much improved in last year.).            History reviewed. No pertinent past medical history.  Past Surgical History:   Procedure Laterality Date    lipoma removal  01/2021    right forehead      Patient Active Problem List   Diagnosis    Environmental allergies    BMI 26.0-26.9,adult    BMI 24.0-24.9, adult    Sebaceous cyst    History of anal fissures    Subclinical hypothyroidism    Anxiety         Objective:      Physical Exam  Constitutional:       General: He is not in acute distress.     Appearance: He is well-developed.   HENT:      Head: Normocephalic and atraumatic.      Right Ear: Tympanic membrane, ear canal and external ear normal.      Left Ear: Tympanic membrane, ear canal and external ear normal.      Mouth/Throat:      Pharynx: No oropharyngeal exudate or posterior oropharyngeal erythema.   Eyes:      General: No scleral icterus.     Conjunctiva/sclera: Conjunctivae normal.      Pupils: Pupils are equal, round, and reactive to light.   Neck:      Thyroid: No thyromegaly.      Comments: No supraclavicular nodes palpated  Cardiovascular:      Rate and Rhythm: Normal rate and regular rhythm.      Pulses: Normal pulses.      Heart sounds: Normal heart sounds. No murmur heard.  Pulmonary:      Effort: Pulmonary effort is normal.      Breath sounds: Normal breath sounds. No wheezing.   Abdominal:      General: Bowel sounds are normal.      Palpations: Abdomen is soft. There is no mass.      Tenderness: There is no abdominal tenderness.   Musculoskeletal:         General: No tenderness.      Cervical back: Normal range of motion and neck supple.      Right lower leg: No edema.      Left lower leg: No edema.   Lymphadenopathy:      Cervical: No cervical adenopathy.   Skin:     Coloration: Skin is not jaundiced or pale.   Neurological:      General: No focal deficit present.      Mental Status: He is alert and oriented to person, place, and time.   Psychiatric:         Mood and Affect: Mood normal.         Behavior: Behavior normal.         Assessment:       Problem List Items Addressed This Visit          Psychiatric    Anxiety     Other Visit Diagnoses       Routine physical examination    -  Primary            Plan:         Will was seen today for annual exam.    Diagnoses and all orders for this visit:    Routine physical examination    Anxiety  Comments:  Much improved compared to previous  "years.  Call with any changes.                     Portions of this note may have been created with voice recognition software. Occasional "wrong-word" or "sound-a-like" substitutions may have occurred due to the inherent limitations of voice recognition software. Please, read the note carefully and recognize, using context, where substitutions have occurred.  "

## 2024-08-12 NOTE — PROGRESS NOTES
"Nutrition Assessment  Session Time:  45 minutes      Client name:  Will Maldonado  :  1995  Age:  29 y.o.  Gender:  male    Client states:  Very pleasant [employer] here today for annual [employer]  physical and nutrition assessment. Patient is recently . He lost weight after but is feeling much better and regaining his weight. He continues to eat healthy during weekdays but will eat out on the weekends and drink alcohol. He continues to participate in weight lifting and other activities. He reports to be consuming about 200 gm of protein every day. Labs not resulted but past year's labs discussed along with weight trend.       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  Has no active medical complaints today although shares history of borderline hyperlipidemia, which he believes may be genetic.  Takes no routine rx medications daily.  Maintains an active lifestyle, performing~45 minutes of weight training + 20 minutes sauna ~4x weekly.  Wishes to improve lower body muscle mass.  Lost 15# over the past year although regained 5# likely due to increased LBM.  Is conscious of PO intake throughout the work week, sharing that his meal pattern is very consistent then in contrast to the weekends, when he allows dietary indulgences (ETOH and dining out).  Enjoys hunting and fishing.  During work week, consumes three meals daily.  Typically has dinner leftovers for lunch inclusive of 3 "servings/portions" of lean protein (chicken thighs, trout, etc.), sweet potato or white rice, and a non-starchy vegetable.  Favors white rice, encompassing ~1/2 of his plate.  Will also have a protein bar (10 gm PRO) following lunch as he feels protein to be satiating.  May also snack on a small handful of chocolate chips mid-afternoon.  Inquired about recommended protein intake as he understood 1 gm/lb. body weight " to be adequate.  Estimated intake averages >165 gm protein/day.  Also, inquired about protein supplements as he currently uses 1 scoop of whey and 1 tsp of creatine daily.  Experiences muscle cramping/tingling sensation on weekends and so, wonders if fluid or creatine induced.  Drinks mostly coffee and water, limiting ETOH intake to Fridays and Saturdays primarily.  Drinks 4 Yeti tumblers of water during work yet not as much on the weekends.  Overall, had several specific nutrition-related questions, which were all answered.  Expressed gratitude for nutrition education and recommendations received.        Anthropometrics  Height:  73  inches     Weight:  197 lbs  BMI:    26.8  % Body Fat: 16.8 (INC from 12.8 x 1 yr)    Clinical Signs/Symptoms  N/V/D:  None  Appetite:  good       No past medical history on file.    Past Surgical History:   Procedure Laterality Date    lipoma removal  01/2021    right forehead       Medications    has a current medication list which includes the following prescription(s): buspirone, cetirizine, and fluticasone propionate.    Vitamins, Minerals, and/or Supplements:  Cretin, Casein (protein); Pro Gold Standard; LMNT oral hydration ( 1000 mg of Na, 200 mg K, 60 mg Mag per packet)  Food/Medication Interactions:  Reviewed     Food Allergies or Intolerances:  NKFA    Social History    Marital status:    Employment:   [employer] - ; [employer] side but will transition to a position working with [employer].     Social History     Tobacco Use    Smoking status: Never     Passive exposure: Never    Smokeless tobacco: Never   Substance Use Topics    Alcohol use: Yes     Comment: socially, on the weekends. about 5 drinks/week.         Lab Reports   Sodium   Date Value Ref Range Status   08/12/2024 137 136 - 145 mmol/L Final     Potassium   Date Value Ref Range Status   08/12/2024 4.4 3.5 - 5.1 mmol/L Final     Chloride   Date Value Ref Range Status   08/12/2024 104  95 - 110 mmol/L Final     CO2   Date Value Ref Range Status   08/12/2024 26 23 - 29 mmol/L Final     Glucose   Date Value Ref Range Status   08/12/2024 92 70 - 110 mg/dL Final     BUN   Date Value Ref Range Status   08/12/2024 20 6 - 20 mg/dL Final     Creatinine   Date Value Ref Range Status   08/12/2024 1.2 0.5 - 1.4 mg/dL Final     Calcium   Date Value Ref Range Status   08/12/2024 9.4 8.7 - 10.5 mg/dL Final     Total Protein   Date Value Ref Range Status   08/12/2024 7.4 6.0 - 8.4 g/dL Final     Albumin   Date Value Ref Range Status   08/12/2024 4.3 3.5 - 5.2 g/dL Final     Total Bilirubin   Date Value Ref Range Status   08/12/2024 0.9 0.1 - 1.0 mg/dL Final     Comment:     For infants and newborns, interpretation of results should be based  on gestational age, weight and in agreement with clinical  observations.    Premature Infant recommended reference ranges:  Up to 24 hours.............<8.0 mg/dL  Up to 48 hours............<12.0 mg/dL  3-5 days..................<15.0 mg/dL  6-29 days.................<15.0 mg/dL       Alkaline Phosphatase   Date Value Ref Range Status   08/12/2024 61 55 - 135 U/L Final     AST   Date Value Ref Range Status   08/12/2024 41 (H) 10 - 40 U/L Final     ALT   Date Value Ref Range Status   08/12/2024 38 10 - 44 U/L Final     Anion Gap   Date Value Ref Range Status   08/12/2024 7 (L) 8 - 16 mmol/L Final     eGFR if    Date Value Ref Range Status   12/01/2021 >60.0 >60 mL/min/1.73 m^2 Final     eGFR if non    Date Value Ref Range Status   12/01/2021 >60.0 >60 mL/min/1.73 m^2 Final     Comment:     Calculation used to obtain the estimated glomerular filtration  rate (eGFR) is the CKD-EPI equation.         Lab Results   Component Value Date    WBC 4.82 08/12/2024    HGB 15.2 08/12/2024    HCT 46.3 08/12/2024    MCV 92 08/12/2024     08/12/2024        Lab Results   Component Value Date    CHOL 211 (H) 08/12/2024     Lab Results   Component Value  Date    HDL 44 08/12/2024     Lab Results   Component Value Date    LDLCALC 137.2 08/12/2024     Lab Results   Component Value Date    TRIG 149 08/12/2024     Lab Results   Component Value Date    CHOLHDL 20.9 08/12/2024     Lab Results   Component Value Date    HGBA1C 5.1 08/12/2024     BP Readings from Last 1 Encounters:   08/12/24 112/60       Food History  Meal Pattern: 3 meals daily and 2-3 snacks daily  Avoids: n/a  Breakfast:  Overnight oats + 1/2 c greek yogurt w/ blueberries + 1-1.5 cups 2% or skim milk + 1 scoop protein powder (30 gm PRO)  Mid-morning Snack:  None  Lunch and Dinner: 1 chicken thighs + 1/2 chicken breast (about 9-10 oz portion meat - chix, fish, or venison), sweet potato or white rice (1/2 plate) + asparagus   Mid-afternoon Snack:  Protein shake 30 gm  *Fluid intake:  Drinks: alcohol , caffeine - 1 - 12oz cups Coffee , mainly water - >200 ounces per day, sports drinks x2 of LMNT    Exercise History:   Patient currently plays soccer for 60 minutes and pickle ball for 120 minutes, each once a week. Patient practices full body resistance training exercises with dynamic stretching, 4 days a week.     Cultural/Spiritual/Personal Preferences:  None identified    Support System:  parents    State of Change:  Contemplation      Barriers to Change:  Readiness     Diagnosis    Excessive protein intake related to Food- and nutrition-related knowledge deficit concerning appropriate protein intake as evidenced by patient states and review of 24 hr recall, shows estimated total protein intake of > 200 gm, that which is higher than estimated protein needs.    Intervention    RMR (Method:  InBody):  1979 kcal  Activity Factor:  1.3    CHANTEL:  2570 kcal    Goals:  1.  Aim to consume about 130 to 150 gms of protein every day. May consume <45 gm of protein per meal and ~10 to 15 gm per snack.   2.  Aim to consume about 128 oz (1 gallon) of water every day.  May increase intake of water if thirst persist after  hydration goal is met but not to exceed 256 oz (2 gal) in a day   3.  Maintain hydration with water, however in the event of overexertion, may consume IV hydration or similar OTC electrolyte drink instead.   4.  Implement LDL Cholesterol-Lowering Nutrition Therapy in the presence of increase levels in Chol 221, , LDL Chol 137, when compared to previous results.   5.  May continue with Gold Standard Protein powder, discontinue use of cretin and casin. Trial other plant based food items such as hummas or edamame for afternoon snack.   6.  Aim to increase intake of plant sterols found in plants like vegetables, fruits, wheat germ, whole grains, beans, lentils, sunflower seeds, and many vegetable oils.   7.  Check out [employer] for additional information on healthy eating.       Nutrition Education  The following education was provided to the patient:  Complimented patient on proactive role in health maintenance.  Complimented patient on dietary compliance/modifications and resulting health improvements.  Complimented patient on physical activity efforts.  Discussed meal planning/[employer] My Plate design.  Discussed healthy snacking.   Suggested dietary modifications based on current dietary behaviors and individual food preferences.  Discussed macronutrient distribution among meals and snacks, including CHO, protein, and fat recommendations and its importance/benefits.  Discussed nutrition-related lab values and dietary and/or lifestyle factors affecting them.  *Lab results were pending at time of consult and so, not discussed with patient.  Discussed recommended protein intake (may include but not limited to recommended food sources, benefits of adequate protein intake, importance of protein distribution, etc.)   Discussed benefits of adequate hydration and recommended fluid intake.  Provided ongoing support, encouragement, and guidance toward improved health efforts.    Patient verbalized understanding of  nutrition education and recommendations received.    Handouts Provided   None    NCM Additional Handouts provided:  LDL Cholesterol-Lowering Nutrition Therapy      Monitoring/Evaluation    Monitor the following:  Weight  BMI  % Body Fat  Caloric intake  Labs:  Annual - CBC, CMP - BUN/CRT, A1C, LIPID PROFILE - HDL, LDL, TRIG, CHOL    Follow Up Plan:  Communication with referring healthcare provider is unnecessary at this time as patient presented as part of annual wellness exam.  However, will follow up with patient in 1-2 years.

## 2024-08-12 NOTE — PROGRESS NOTES
Pt. Has no significant cardiovascular or pulmonary history.    Physical Limitations:  Patient strained his right glute muscle while squatting about three weeks ago and is still limited from squatting.       Current exercise routine:  Patient currently plays soccer for 60 minutes and pickle ball for 120 minutes, each once a week.  Patient practices full body resistance training exercises with dynamic stretching, 4 days a week.    Goals:  Patient set a goal weight of 210 lbs  Notes:  Patient was very friendly and engaged.  He added lower body strength training exercises into his routine about a year ago and had a goal to reach 200 lbs while building muscle mass which he accomplished.  He was pleased with his outcomes and has already set new goals.  Patient asked questions and was receptive to all recommendations.      The fitness evaluation results are as follows:    D.O.S. 8/12/2024 9/12/2023 8/26/2022   Height (in): 72 72 72   Weight (lbs): 197.4 180.5 187.1   BMI: 26.672128 24.017631 25.220824   Body Fat (%): 16.80 12.80 16.80   Waist (cm): 87 83 83   RBP (mmHg): 104/74 100/74 120/72   RHR (bpm): 58 58 56    Strength Dominant (Lbs): 150 151 155    Strength Non Dominant (Lbs): 144 140 140   Push-up Assessment: 53 40 36   Curl-up Assessment: 75 75 75   Flexibility Testing (cm): 29 37 31   REE (kcals): 1979 1913 1895       Age/gender stratified assessment:    Resting BP: Within Normal Limits   Body Fat %: Very Good   Waist Circumfernece: Low Risk    Strength Dominant: 90th    Strength Non Dominant: 90th   Upper Body Endurance: Excellent   Abdominal Endurance: Well Above Average   Lower body Flexibiltiy: Good       Recommended fitness guidelines:    -150 minutes of moderate intensity aerobic exercise per week or 75 minutes of vigorous intensity aerobic exercise per week.    -2 to 4 days per week of resistance training for each muscle group.      -Daily stretching with a hold of at least 30 seconds per  muscle group.  Practice the seated hamstring stretch, demonstrated during the evaluation, daily.

## 2025-07-08 DIAGNOSIS — Z00.00 ROUTINE MEDICAL EXAM: Primary | ICD-10-CM

## 2025-08-05 ENCOUNTER — OFFICE VISIT (OUTPATIENT)
Dept: INTERNAL MEDICINE | Facility: CLINIC | Age: 30
End: 2025-08-05
Payer: COMMERCIAL

## 2025-08-05 ENCOUNTER — CLINICAL SUPPORT (OUTPATIENT)
Dept: INTERNAL MEDICINE | Facility: CLINIC | Age: 30
End: 2025-08-05
Payer: COMMERCIAL

## 2025-08-05 ENCOUNTER — HOSPITAL ENCOUNTER (OUTPATIENT)
Dept: RADIOLOGY | Facility: HOSPITAL | Age: 30
Discharge: HOME | End: 2025-08-05
Attending: INTERNAL MEDICINE
Payer: COMMERCIAL

## 2025-08-05 ENCOUNTER — HOSPITAL ENCOUNTER (OUTPATIENT)
Dept: CARDIOLOGY | Facility: CLINIC | Age: 30
Discharge: HOME | End: 2025-08-05
Payer: COMMERCIAL

## 2025-08-05 ENCOUNTER — PATIENT MESSAGE (OUTPATIENT)
Dept: INTERNAL MEDICINE | Facility: CLINIC | Age: 30
End: 2025-08-05

## 2025-08-05 VITALS
SYSTOLIC BLOOD PRESSURE: 120 MMHG | HEART RATE: 68 BPM | BODY MASS INDEX: 26.36 KG/M2 | OXYGEN SATURATION: 99 % | HEIGHT: 73 IN | WEIGHT: 198.88 LBS | DIASTOLIC BLOOD PRESSURE: 60 MMHG

## 2025-08-05 DIAGNOSIS — Z00.00 ANNUAL PHYSICAL EXAM: Primary | ICD-10-CM

## 2025-08-05 DIAGNOSIS — Z00.00 ROUTINE MEDICAL EXAM: ICD-10-CM

## 2025-08-05 DIAGNOSIS — M54.50 CHRONIC LOW BACK PAIN WITHOUT SCIATICA, UNSPECIFIED BACK PAIN LATERALITY: ICD-10-CM

## 2025-08-05 DIAGNOSIS — S93.511A SPRAIN OF INTERPHALANGEAL JOINT OF RIGHT GREAT TOE, INITIAL ENCOUNTER: ICD-10-CM

## 2025-08-05 DIAGNOSIS — Z00.00 ROUTINE PHYSICAL EXAMINATION: Primary | ICD-10-CM

## 2025-08-05 DIAGNOSIS — M79.674 GREAT TOE PAIN, RIGHT: ICD-10-CM

## 2025-08-05 DIAGNOSIS — E78.5 HYPERLIPIDEMIA, UNSPECIFIED HYPERLIPIDEMIA TYPE: ICD-10-CM

## 2025-08-05 DIAGNOSIS — L65.9 HAIR LOSS: ICD-10-CM

## 2025-08-05 DIAGNOSIS — Z00.00 ROUTINE GENERAL MEDICAL EXAMINATION AT A HEALTH CARE FACILITY: Primary | ICD-10-CM

## 2025-08-05 DIAGNOSIS — G89.29 CHRONIC LOW BACK PAIN WITHOUT SCIATICA, UNSPECIFIED BACK PAIN LATERALITY: ICD-10-CM

## 2025-08-05 DIAGNOSIS — L23.7 POISON IVY: ICD-10-CM

## 2025-08-05 DIAGNOSIS — E03.8 SUBCLINICAL HYPOTHYROIDISM: ICD-10-CM

## 2025-08-05 LAB
ALBUMIN SERPL BCP-MCNC: 4.6 G/DL (ref 3.5–5.2)
ALP SERPL-CCNC: 63 UNIT/L (ref 40–150)
ALT SERPL W/O P-5'-P-CCNC: 42 UNIT/L (ref 0–55)
ANION GAP (OHS): 8 MMOL/L (ref 8–16)
AST SERPL-CCNC: 39 UNIT/L (ref 0–50)
BILIRUB SERPL-MCNC: 0.8 MG/DL (ref 0.1–1)
BUN SERPL-MCNC: 22 MG/DL (ref 6–20)
CALCIUM SERPL-MCNC: 9.3 MG/DL (ref 8.7–10.5)
CHLORIDE SERPL-SCNC: 102 MMOL/L (ref 95–110)
CHOLEST SERPL-MCNC: 211 MG/DL (ref 120–199)
CHOLEST/HDLC SERPL: 4.1 {RATIO} (ref 2–5)
CO2 SERPL-SCNC: 29 MMOL/L (ref 23–29)
CREAT SERPL-MCNC: 1.4 MG/DL (ref 0.5–1.4)
EAG (OHS): 105 MG/DL (ref 68–131)
ERYTHROCYTE [DISTWIDTH] IN BLOOD BY AUTOMATED COUNT: 12.1 % (ref 11.5–14.5)
GFR SERPLBLD CREATININE-BSD FMLA CKD-EPI: >60 ML/MIN/1.73/M2
GLUCOSE SERPL-MCNC: 91 MG/DL (ref 70–110)
HBA1C MFR BLD: 5.3 % (ref 4–5.6)
HCT VFR BLD AUTO: 45 % (ref 40–54)
HDLC SERPL-MCNC: 51 MG/DL (ref 40–75)
HDLC SERPL: 24.2 % (ref 20–50)
HGB BLD-MCNC: 14.9 GM/DL (ref 14–18)
LDLC SERPL CALC-MCNC: 135 MG/DL (ref 63–159)
MCH RBC QN AUTO: 30.2 PG (ref 27–31)
MCHC RBC AUTO-ENTMCNC: 33.1 G/DL (ref 32–36)
MCV RBC AUTO: 91 FL (ref 82–98)
NONHDLC SERPL-MCNC: 160 MG/DL
OHS QRS DURATION: 90 MS
OHS QTC CALCULATION: 401 MS
PLATELET # BLD AUTO: 239 K/UL (ref 150–450)
PMV BLD AUTO: 10.1 FL (ref 9.2–12.9)
POTASSIUM SERPL-SCNC: 4.2 MMOL/L (ref 3.5–5.1)
PROT SERPL-MCNC: 7.5 GM/DL (ref 6–8.4)
RBC # BLD AUTO: 4.93 M/UL (ref 4.6–6.2)
SODIUM SERPL-SCNC: 139 MMOL/L (ref 136–145)
TRIGL SERPL-MCNC: 125 MG/DL (ref 30–150)
TSH SERPL-ACNC: 2.5 UIU/ML (ref 0.4–4)
WBC # BLD AUTO: 4.8 K/UL (ref 3.9–12.7)

## 2025-08-05 PROCEDURE — 99999 PR PBB SHADOW E&M-EST. PATIENT-LVL IV: CPT | Mod: PBBFAC,,, | Performed by: INTERNAL MEDICINE

## 2025-08-05 PROCEDURE — 3074F SYST BP LT 130 MM HG: CPT | Mod: CPTII,S$GLB,, | Performed by: INTERNAL MEDICINE

## 2025-08-05 PROCEDURE — 97802 MEDICAL NUTRITION INDIV IN: CPT | Mod: S$GLB,,, | Performed by: DIETITIAN, REGISTERED

## 2025-08-05 PROCEDURE — 73630 X-RAY EXAM OF FOOT: CPT | Mod: TC,RT

## 2025-08-05 PROCEDURE — 99999 PR PBB SHADOW E&M-EST. PATIENT-LVL I: CPT | Mod: PBBFAC,,,

## 2025-08-05 PROCEDURE — 99395 PREV VISIT EST AGE 18-39: CPT | Mod: S$GLB,,, | Performed by: INTERNAL MEDICINE

## 2025-08-05 PROCEDURE — 84443 ASSAY THYROID STIM HORMONE: CPT

## 2025-08-05 PROCEDURE — 1160F RVW MEDS BY RX/DR IN RCRD: CPT | Mod: CPTII,S$GLB,, | Performed by: INTERNAL MEDICINE

## 2025-08-05 PROCEDURE — 73630 X-RAY EXAM OF FOOT: CPT | Mod: 26,RT,, | Performed by: RADIOLOGY

## 2025-08-05 PROCEDURE — 80061 LIPID PANEL: CPT

## 2025-08-05 PROCEDURE — 85027 COMPLETE CBC AUTOMATED: CPT

## 2025-08-05 PROCEDURE — 80053 COMPREHEN METABOLIC PANEL: CPT

## 2025-08-05 PROCEDURE — 3044F HG A1C LEVEL LT 7.0%: CPT | Mod: CPTII,S$GLB,, | Performed by: INTERNAL MEDICINE

## 2025-08-05 PROCEDURE — 93010 ELECTROCARDIOGRAM REPORT: CPT | Mod: S$GLB,,, | Performed by: INTERNAL MEDICINE

## 2025-08-05 PROCEDURE — 3008F BODY MASS INDEX DOCD: CPT | Mod: CPTII,S$GLB,, | Performed by: INTERNAL MEDICINE

## 2025-08-05 PROCEDURE — 1159F MED LIST DOCD IN RCRD: CPT | Mod: CPTII,S$GLB,, | Performed by: INTERNAL MEDICINE

## 2025-08-05 PROCEDURE — 3078F DIAST BP <80 MM HG: CPT | Mod: CPTII,S$GLB,, | Performed by: INTERNAL MEDICINE

## 2025-08-05 PROCEDURE — 97750 PHYSICAL PERFORMANCE TEST: CPT | Mod: S$GLB,,, | Performed by: INTERNAL MEDICINE

## 2025-08-05 PROCEDURE — 83036 HEMOGLOBIN GLYCOSYLATED A1C: CPT

## 2025-08-05 RX ORDER — MINOXIDIL 2.5 MG/1
TABLET ORAL
COMMUNITY
Start: 2025-03-13

## 2025-08-05 NOTE — LETTER
August 5, 2025    Will Maldonado  [address] [facility] [address]             [facility]  [address]  [facility] [address]  Phone: [phone]  Fax: [phone] Dear Mr. Maldonado:        Thank you for allowing me to serve you and perform your Executive Health exam on 8/5/2025.  This letter will serve a brief summary of the history, physical findings, and laboratory/studies performed and recommendations at that time.    Reason for Visit: Executive Health Preventive Physical Examination    Subjective:       Patient ID: Will Maldonado is a 30 y.o. male.    Chief Complaint: Annual Exam    Patient in for executive health physical.  He has a number of issues to review today.  Generally he is feeling okay but he has some problems with his right great toe, concerns about hair loss, lab review, poison ivy and wanted to discuss HPV.  Generally healthy male, previously , , has had some girlfriends.  Most recently he says his girlfriend told him she was diagnosed with HPV on a Pap smear.  Patient states he has no lesions or sores.  No previous knowledge of this with his wife for other female partners.  He has no history of anal sex or relations with men so does not consider himself at increased risk.    He had 3 HPV doses when he was younger and has had no other concerns.  We reviewed that he may not have any obvious or active lesions and this could have been something short-lived for him.  He said the girlfriend mentioned her findings were thought to be mild and low-grade and should resolve on their own according to her gynecologist.    Patient injured his right great toe about 4 weeks ago.  Did not get it addressed initially but there was bruising, pain, tenderness.  It has improved significantly except for dorsiflexion that he was thinking about playing soccer again and wanted to know my thoughts.  There is still some tenderness so I suggested an  x-ray before he returns to that activity    He has some small patches a poison ivy on the left side of the neck in the left forearm so we will evaluate and recommend over-the-counter hydrocortisone.  EKG was normal   Labs show mild hyperlipidemia, slight bump in BUN and creatinine but he exercises a lot and takes creatine.  We will monitor this and he will keep hydrated.  Other labs were stable.    No change in family history      Review of Systems   Constitutional:  Negative for chills, fatigue and fever.   HENT:  Negative for nosebleeds and trouble swallowing.    Eyes:  Negative for pain and visual disturbance.   Respiratory:  Negative for cough, shortness of breath and wheezing.    Cardiovascular:  Negative for chest pain and palpitations.   Gastrointestinal:  Negative for abdominal pain, constipation, diarrhea, nausea and vomiting.   Genitourinary:  Negative for difficulty urinating and hematuria.   Musculoskeletal:  Positive for arthralgias (right great toe). Negative for back pain and neck pain.   Integumentary:  Positive for rash (neck and left forearm).   Neurological:  Negative for dizziness and headaches.   Hematological:  Does not bruise/bleed easily.   Psychiatric/Behavioral:  Negative for dysphoric mood and sleep disturbance.            History reviewed. No pertinent past medical history.  Past Surgical History:   Procedure Laterality Date    lipoma removal  01/2021    right forehead      Problem List[1]     Objective:      Physical Exam  Constitutional:       General: He is not in acute distress.     Appearance: He is well-developed.   HENT:      Head: Normocephalic and atraumatic.      Right Ear: Tympanic membrane, ear canal and external ear normal.      Left Ear: Tympanic membrane, ear canal and external ear normal.      Mouth/Throat:      Pharynx: No oropharyngeal exudate or posterior oropharyngeal erythema.   Eyes:      General: No scleral icterus.     Conjunctiva/sclera: Conjunctivae normal.       Pupils: Pupils are equal, round, and reactive to light.   Neck:      Thyroid: No thyromegaly.      Comments: No supraclavicular nodes palpated  Cardiovascular:      Rate and Rhythm: Normal rate and regular rhythm.      Pulses: Normal pulses.      Heart sounds: Normal heart sounds. No murmur heard.  Pulmonary:      Effort: Pulmonary effort is normal.      Breath sounds: Normal breath sounds. No wheezing.   Abdominal:      General: Bowel sounds are normal.      Palpations: Abdomen is soft. There is no mass.      Tenderness: There is no abdominal tenderness.   Genitourinary:     Penis: Normal.       Comments: No penile or scrotal lesions.  No lumps skin lesions or genital warts that I could identify  Musculoskeletal:         General: Tenderness (mild tenderness right great toe with dorsiflexion) present.      Cervical back: Normal range of motion and neck supple.      Right lower leg: No edema.      Left lower leg: No edema.   Lymphadenopathy:      Cervical: No cervical adenopathy.   Skin:     Coloration: Skin is not jaundiced or pale.      Findings: Rash (for small patches on the left side of the neck and 1 on the forearm possible drying poison ivy) present.   Neurological:      General: No focal deficit present.      Mental Status: He is alert and oriented to person, place, and time.   Psychiatric:         Mood and Affect: Mood normal.         Behavior: Behavior normal.         Assessment:       Problem List Items Addressed This Visit          Endocrine    Subclinical hypothyroidism     Other Visit Diagnoses         Routine physical examination    -  Primary      Hair loss          Hyperlipidemia, unspecified hyperlipidemia type        Mild, chol 211      Chronic low back pain without sciatica, unspecified back pain laterality          Poison ivy        neck/left forearm      Sprain of interphalangeal joint of right great toe, initial encounter        Relevant Orders    X-Ray Foot Complete Right (Completed)       Great toe pain, right        Relevant Orders    X-Ray Foot Complete Right (Completed)            Plan:         Will was seen today for annual exam.    Diagnoses and all orders for this visit:    Routine physical examination    Subclinical hypothyroidism  Continue to monitor thyroid  Hair loss  minoxidil  Hyperlipidemia, unspecified hyperlipidemia type  Comments:  Mild, chol 211  Continue to monitor  Chronic low back pain without sciatica, unspecified back pain laterality  Continue PT  Poison ivy  Comments:  neck/left forearm  Over-the-counter 1% hydrocortisone  Sprain of interphalangeal joint of right great toe, initial encounter  -     X-Ray Foot Complete Right; Future    Great toe pain, right  -     X-Ray Foot Complete Right; Future       No additional screening needed for possible HPV exposure at this time-monitor for any problems with throat or groin.      Follow-up annually    Component      Latest Ref Rng 8/5/2025   Sodium      136 - 145 mmol/L 139    Potassium      3.5 - 5.1 mmol/L 4.2    Chloride      95 - 110 mmol/L 102    CO2      23 - 29 mmol/L 29    Glucose      70 - 110 mg/dL 91    BUN      6 - 20 mg/dL 22 (H)    Creatinine      0.5 - 1.4 mg/dL 1.4    Calcium      8.7 - 10.5 mg/dL 9.3    PROTEIN TOTAL      6.0 - 8.4 gm/dL 7.5    Albumin      3.5 - 5.2 g/dL 4.6    BILIRUBIN TOTAL      0.1 - 1.0 mg/dL 0.8    ALP      40 - 150 unit/L 63    AST      0 - 50 unit/L 39    ALT      0 - 55 unit/L 42    Anion Gap      8 - 16 mmol/L 8    eGFR      >60 mL/min/1.73/m2 >60    WBC      3.90 - 12.70 K/uL 4.80    RBC      4.60 - 6.20 M/uL 4.93    Hemoglobin      14.0 - 18.0 gm/dL 14.9    Hematocrit      40.0 - 54.0 % 45.0    MCV      82 - 98 fL 91    MCHC      32.0 - 36.0 g/dL 33.1    RDW      11.5 - 14.5 % 12.1    Platelet Count      150 - 450 K/uL 239    MCH      27.0 - 31.0 pg 30.2    MPV      9.2 - 12.9 fL 10.1    Cholesterol Total      120 - 199 mg/dL 211 (H)    Triglycerides      30 - 150 mg/dL 125    HDL       "40 - 75 mg/dL 51    LDL Cholesterol      63.0 - 159.0 mg/dL 135.0    HDL/Cholesterol Ratio      20.0 - 50.0 % 24.2    Total Cholesterol/HDL Ratio      2.0 - 5.0  4.1    Non-HDL Cholesterol      mg/dL 160    Hemoglobin A1C External      4.0 - 5.6 % 5.3    Estimated Avg Glucose      68 - 131 mg/dL 105    TSH      0.400 - 4.000 uIU/mL 2.498       No fracture on foot x-ray    Portions of this note may have been created with voice recognition software. Occasional "wrong-word" or "sound-a-like" substitutions may have occurred due to the inherent limitations of voice recognition software. Please, read the note carefully and recognize, using context, where substitutions have occurred.            Subjective:       Patient ID: Will Maldonado is a 30 y.o. male.    Chief Complaint: Annual Exam    Patient in for [employer] physical.  He has a number of issues to review today.  Generally he is feeling okay but he has some problems with his right great toe, concerns about hair loss, lab review, poison ivy and wanted to discuss HPV.  Generally healthy male, previously , , has had some girlfriends.  Most recently he says his girlfriend told him she was diagnosed with HPV on a Pap smear.  Patient states he has no lesions or sores.  No previous knowledge of this with his wife for other female partners.  He has no history of anal sex or relations with men so does not consider himself at increased risk.    He had 3 HPV doses when he was younger and has had no other concerns.  We reviewed that he may not have any obvious or active lesions and this could have been something short-lived for him.  He said the girlfriend mentioned her findings were thought to be mild and low-grade and should resolve on their own according to her gynecologist.    Patient injured his right great toe about 4 weeks ago.  Did not get it addressed initially but there was bruising, pain, tenderness.  It has improved significantly except " for dorsiflexion that he was thinking about playing soccer again and wanted to know my thoughts.  There is still some tenderness so I suggested an x-ray before he returns to that activity    He has some small patches a poison ivy on the left side of the neck in the left forearm so we will evaluate and recommend over-the-counter hydrocortisone.  EKG was normal   Labs show mild hyperlipidemia, slight bump in BUN and creatinine but he exercises a lot and takes creatine.  We will monitor this and he will keep hydrated.  Other labs were stable.    No change in family history      Review of Systems   Constitutional:  Negative for chills, fatigue and fever.   HENT:  Negative for nosebleeds and trouble swallowing.    Eyes:  Negative for pain and visual disturbance.   Respiratory:  Negative for cough, shortness of breath and wheezing.    Cardiovascular:  Negative for chest pain and palpitations.   Gastrointestinal:  Negative for abdominal pain, constipation, diarrhea, nausea and vomiting.   Genitourinary:  Negative for difficulty urinating and hematuria.   Musculoskeletal:  Positive for arthralgias (right great toe). Negative for back pain and neck pain.   Integumentary:  Positive for rash (neck and left forearm).   Neurological:  Negative for dizziness and headaches.   Hematological:  Does not bruise/bleed easily.   Psychiatric/Behavioral:  Negative for dysphoric mood and sleep disturbance.            History reviewed. No pertinent past medical history.  Past Surgical History:   Procedure Laterality Date    lipoma removal  01/2021    right forehead      Problem List[2]     Objective:      Physical Exam  Constitutional:       General: He is not in acute distress.     Appearance: He is well-developed.   HENT:      Head: Normocephalic and atraumatic.      Right Ear: Tympanic membrane, ear canal and external ear normal.      Left Ear: Tympanic membrane, ear canal and external ear normal.      Mouth/Throat:      Pharynx: No  oropharyngeal exudate or posterior oropharyngeal erythema.   Eyes:      General: No scleral icterus.     Conjunctiva/sclera: Conjunctivae normal.      Pupils: Pupils are equal, round, and reactive to light.   Neck:      Thyroid: No thyromegaly.      Comments: No supraclavicular nodes palpated  Cardiovascular:      Rate and Rhythm: Normal rate and regular rhythm.      Pulses: Normal pulses.      Heart sounds: Normal heart sounds. No murmur heard.  Pulmonary:      Effort: Pulmonary effort is normal.      Breath sounds: Normal breath sounds. No wheezing.   Abdominal:      General: Bowel sounds are normal.      Palpations: Abdomen is soft. There is no mass.      Tenderness: There is no abdominal tenderness.   Genitourinary:     Penis: Normal.       Comments: No penile or scrotal lesions.  No lumps skin lesions or genital warts that I could identify  Musculoskeletal:         General: Tenderness (mild tenderness right great toe with dorsiflexion) present.      Cervical back: Normal range of motion and neck supple.      Right lower leg: No edema.      Left lower leg: No edema.   Lymphadenopathy:      Cervical: No cervical adenopathy.   Skin:     Coloration: Skin is not jaundiced or pale.      Findings: Rash (for small patches on the left side of the neck and 1 on the forearm possible drying poison ivy) present.   Neurological:      General: No focal deficit present.      Mental Status: He is alert and oriented to person, place, and time.   Psychiatric:         Mood and Affect: Mood normal.         Behavior: Behavior normal.         Assessment:       Problem List Items Addressed This Visit          Endocrine    Subclinical hypothyroidism     Other Visit Diagnoses         Routine physical examination    -  Primary      Hair loss          Hyperlipidemia, unspecified hyperlipidemia type        Mild, chol 211      Chronic low back pain without sciatica, unspecified back pain laterality          Poison ivy        neck/left  "forearm      Sprain of interphalangeal joint of right great toe, initial encounter        Relevant Orders    X-Ray Foot Complete Right (Completed)      Great toe pain, right        Relevant Orders    X-Ray Foot Complete Right (Completed)            Plan:         Will was seen today for annual exam.    Diagnoses and all orders for this visit:    Routine physical examination    Subclinical hypothyroidism  Continue to monitor thyroid  Hair loss  minoxidil  Hyperlipidemia, unspecified hyperlipidemia type  Comments:  Mild, chol 211  Continue to monitor  Chronic low back pain without sciatica, unspecified back pain laterality  Continue PT  Poison ivy  Comments:  neck/left forearm  Over-the-counter 1% hydrocortisone  Sprain of interphalangeal joint of right great toe, initial encounter  -     X-Ray Foot Complete Right; Future    Great toe pain, right  -     X-Ray Foot Complete Right; Future       No additional screening needed for possible HPV exposure at this time-monitor for any problems with throat or groin.      Follow-up annually        Portions of this note may have been created with voice recognition software. Occasional "wrong-word" or "sound-a-like" substitutions may have occurred due to the inherent limitations of voice recognition software. Please, read the note carefully and recognize, using context, where substitutions have occurred.           Labs:  Component      Latest Ref Rng 8/5/2025   Sodium      136 - 145 mmol/L 139    Potassium      3.5 - 5.1 mmol/L 4.2    Chloride      95 - 110 mmol/L 102    CO2      23 - 29 mmol/L 29    Glucose      70 - 110 mg/dL 91    BUN      6 - 20 mg/dL 22 (H)    Creatinine      0.5 - 1.4 mg/dL 1.4    Calcium      8.7 - 10.5 mg/dL 9.3    PROTEIN TOTAL      6.0 - 8.4 gm/dL 7.5    Albumin      3.5 - 5.2 g/dL 4.6    BILIRUBIN TOTAL      0.1 - 1.0 mg/dL 0.8    ALP      40 - 150 unit/L 63    AST      0 - 50 unit/L 39    ALT      0 - 55 unit/L 42    Anion Gap      8 - 16 mmol/L 8  "   eGFR      >60 mL/min/1.73/m2 >60    WBC      3.90 - 12.70 K/uL 4.80    RBC      4.60 - 6.20 M/uL 4.93    Hemoglobin      14.0 - 18.0 gm/dL 14.9    Hematocrit      40.0 - 54.0 % 45.0    MCV      82 - 98 fL 91    MCHC      32.0 - 36.0 g/dL 33.1    RDW      11.5 - 14.5 % 12.1    Platelet Count      150 - 450 K/uL 239    MCH      27.0 - 31.0 pg 30.2    MPV      9.2 - 12.9 fL 10.1    Cholesterol Total      120 - 199 mg/dL 211 (H)    Triglycerides      30 - 150 mg/dL 125    HDL      40 - 75 mg/dL 51    LDL Cholesterol      63.0 - 159.0 mg/dL 135.0    HDL/Cholesterol Ratio      20.0 - 50.0 % 24.2    Total Cholesterol/HDL Ratio      2.0 - 5.0  4.1    Non-HDL Cholesterol      mg/dL 160    Hemoglobin A1C External      4.0 - 5.6 % 5.3    Estimated Avg Glucose      68 - 131 mg/dL 105    TSH      0.400 - 4.000 uIU/mL 2.498       Legend:  (H) High    Results for orders placed or performed during the hospital encounter of 08/05/25   EKG 12-lead    Collection Time: 08/05/25  8:43 AM   Result Value Ref Range    QRS Duration 90 ms    [facility] QTC Calculation 401 ms        No fracture on foot x-ray    Assessment/Recommendations:  Routine Health Maintenance    At this time, you appear to be in good medical condition.  I look forward to seeing you again next year.  Please contact me should you have any questions or concerns regarding physical findings, or my recommendations.              If you have any questions or concerns, please don't hesitate to call.    Sincerely,        Mario Negrete MD          [1]  Patient Active Problem List  Diagnosis    Environmental allergies    BMI 26.0-26.9,adult    BMI 24.0-24.9, adult    Sebaceous cyst    History of anal fissures    Subclinical hypothyroidism    Anxiety   [2]  Patient Active Problem List  Diagnosis    Environmental allergies    BMI 26.0-26.9,adult    BMI 24.0-24.9, adult    Sebaceous cyst    History of anal fissures    Subclinical hypothyroidism    Anxiety

## 2025-08-05 NOTE — PROGRESS NOTES
Pt. Has no significant cardiovascular or pulmonary history.    Physical Limitations:  Patient slipped a disc in his lumbar spine while leg pressing in January.  He is currently in physical therapy and is limited from heavy squatting, dead lifting, and bent over rows.  He broke his right first metatarsal while playing soccer about a month ago and has been limited from running for the last month.  Patient chose to defer the core endurance test.      Current exercise routine:  Patient currently runs for 10-30 minutes, 3 days a week and plays soccer for 50 minutes, once a week.  Patient practices full-body resistance training exercises, 3-4 days a week.  Patient does not follow any formal flexibility routine at the current time.     Goals:  Patient set a goal weight of 190 lbs  Notes:  Patient was friendly and engaged.  He noted that he initially injured his back in college, which healed on its own.  He has been in physical therapy 2 days a week for the last two months and is planning to go down to once a week.  His therapist advised him to try the seated hamstring stretch in the hopes that he can start incorporating regular stretching back into his routine.  We discussed how he can progress his strength training routine once he feels ready.  Patient asked questions and was receptive to all recommendations.      The fitness evaluation results are as follows:    D.O.S. 8/5/2025 8/12/2024 9/12/2023 8/26/2022   Height (in): 72 72 72 72   Weight (lbs): 196.6 197.4 180.5 187.1   BMI: 26.443137 26.130462 24.073134 25.71664   Body Fat (%): 16.00 16.80 12.80 16.80   Waist (cm): 89 87 83 83   RBP (mmHg): 98/60 104/74 100/74 120/72   RHR (bpm): 68 58 58 56    Strength Dominant (Lbs): 147 150 151 155    Strength Non Dominant (Lbs): 136 144 140 140   Push-up Assessment: 41 53 40 36   Curl-up Assessment: Deferred 75 75 75   Flexibility Testing (cm): 23 29 37 31   REE (kcals): 1989 1979 1913 1895       Age/gender stratified  assessment:    Resting BP: Within Normal Limits   Body Fat %: Very Good   Waist Circumfernece: Low Risk    Strength Dominant: 90th    Strength Non Dominant: 90th   Upper Body Endurance: Excellent   Abdominal Endurance: Deferred   Lower body Flexibiltiy: Fair       Recommended fitness guidelines:    -150 minutes of moderate intensity aerobic exercise per week or 75 minutes of vigorous intensity aerobic exercise per week.    -2 to 4 days per week of resistance training for each muscle group.      -Daily stretching with a hold of at least 30 seconds per muscle group.

## 2025-08-05 NOTE — PROGRESS NOTES
Nutrition Assessment  Session Time:  45 minutes      Client name:  Will Maldonado  :  1995  Age:  30 y.o.  Gender:  male    Client states:  Very pleasant [employer] employee here for his annual [employer] physical.  Familiar with program and nutrition services due to past participation.  Employed at [employer] (day shifts).  Actively engaged in health efforts.  Injured his right great toe and completed x-ray earlier today.  Remains physically active, nonetheless, playing soccer, weight training, and running weekly.  Initially desired to achieve goal weight of 200# and so, increased protein intake to ~200 gm/day as he understood protein needs to be based on body weight.  Achieved goal weight and now desires to lose weight (goal weight:  190#).  Considers his PO intake to be routine, recalling usual intake of three meals and three snacks daily.  Incorporates protein with each meal and snack, recalling selection of creatine, Greek yogurt, 1% milk, protein powder, deer, venison, chicken, tuna steak, beef, etc.  Occasionally eliminates CHO from meals.  Plans meals in advance of work week and typically incorporates lean protein, vegetables, and/or CHO.  Lab results revealed elevated BUN, likely due to increased protein intake and hydration.  Drinks water throughout the day in addition to coffee, electrolyte drinks, energy drinks, and ETOH.  Drinks socially on the weekends, noting average of a case of lite beer/weekend or less.    Anthropometrics  Height:  6'     Weight:  196.6#  BMI:  26.7  % Body Fat:  16%    Clinical Signs/Symptoms  N/V/D:  None  Appetite:  good       No past medical history on file.    Past Surgical History:   Procedure Laterality Date    lipoma removal  2021    right forehead       Medications    has a current medication list which includes the following prescription(s): cetirizine and minoxidil.    Vitamins, Minerals, and/or Supplements:  MVI + Creatine (5 gm)     Food/Medication  Interactions:  Reviewed     Food Allergies or Intolerances:  NKFA     Social History    Marital status:    Employment:  [employer])    Social History     Tobacco Use    Smoking status: Never     Passive exposure: Never    Smokeless tobacco: Never   Substance Use Topics    Alcohol use: Yes     Comment: socially, on the weekends. about 5 drinks/week.         Lab Reports   Sodium   Date Value Ref Range Status   08/05/2025 139 136 - 145 mmol/L Final   08/12/2024 137 136 - 145 mmol/L Final     Potassium   Date Value Ref Range Status   08/05/2025 4.2 3.5 - 5.1 mmol/L Final   08/12/2024 4.4 3.5 - 5.1 mmol/L Final     Chloride   Date Value Ref Range Status   08/05/2025 102 95 - 110 mmol/L Final   08/12/2024 104 95 - 110 mmol/L Final     CO2   Date Value Ref Range Status   08/05/2025 29 23 - 29 mmol/L Final   08/12/2024 26 23 - 29 mmol/L Final     Glucose   Date Value Ref Range Status   08/05/2025 91 70 - 110 mg/dL Final   08/12/2024 92 70 - 110 mg/dL Final     BUN   Date Value Ref Range Status   08/05/2025 22 (H) 6 - 20 mg/dL Final     Creatinine   Date Value Ref Range Status   08/05/2025 1.4 0.5 - 1.4 mg/dL Final     Calcium   Date Value Ref Range Status   08/05/2025 9.3 8.7 - 10.5 mg/dL Final   08/12/2024 9.4 8.7 - 10.5 mg/dL Final     Protein Total   Date Value Ref Range Status   08/05/2025 7.5 6.0 - 8.4 gm/dL Final     Total Protein   Date Value Ref Range Status   08/12/2024 7.4 6.0 - 8.4 g/dL Final     Albumin   Date Value Ref Range Status   08/05/2025 4.6 3.5 - 5.2 g/dL Final   08/12/2024 4.3 3.5 - 5.2 g/dL Final     Total Bilirubin   Date Value Ref Range Status   08/12/2024 0.9 0.1 - 1.0 mg/dL Final     Comment:     For infants and newborns, interpretation of results should be based  on gestational age, weight and in agreement with clinical  observations.    Premature Infant recommended reference ranges:  Up to 24 hours.............<8.0 mg/dL  Up to 48 hours............<12.0 mg/dL  3-5  days..................<15.0 mg/dL  6-29 days.................<15.0 mg/dL       Bilirubin Total   Date Value Ref Range Status   08/05/2025 0.8 0.1 - 1.0 mg/dL Final     Comment:     For infants and newborns, interpretation of results should be based   on gestational age, weight and in agreement with clinical   observations.    Premature Infant recommended reference ranges:   0-24 hours:  <8.0 mg/dL   24-48 hours: <12.0 mg/dL   3-5 days:    <15.0 mg/dL   6-29 days:   <15.0 mg/dL     Alkaline Phosphatase   Date Value Ref Range Status   08/12/2024 61 55 - 135 U/L Final     ALP   Date Value Ref Range Status   08/05/2025 63 40 - 150 unit/L Final     AST   Date Value Ref Range Status   08/05/2025 39 0 - 50 unit/L Final     Comment:       An activated reagent was used, which may result in slightly higher values compared to standard method.   08/12/2024 41 (H) 10 - 40 U/L Final     ALT   Date Value Ref Range Status   08/05/2025 42 0 - 55 unit/L Final     Comment:       An activated reagent was used, which may result in slightly higher values compared to standard method.   08/12/2024 38 10 - 44 U/L Final     Anion Gap   Date Value Ref Range Status   08/05/2025 8 8 - 16 mmol/L Final     eGFR if    Date Value Ref Range Status   12/01/2021 >60.0 >60 mL/min/1.73 m^2 Final     eGFR if non    Date Value Ref Range Status   12/01/2021 >60.0 >60 mL/min/1.73 m^2 Final     Comment:     Calculation used to obtain the estimated glomerular filtration  rate (eGFR) is the CKD-EPI equation.         Lab Results   Component Value Date    WBC 4.80 08/05/2025    HGB 14.9 08/05/2025    HCT 45.0 08/05/2025    MCV 91 08/05/2025     08/05/2025        Lab Results   Component Value Date    CHOL 211 (H) 08/05/2025     Lab Results   Component Value Date    HDL 51 08/05/2025     Lab Results   Component Value Date    LDLCALC 135.0 08/05/2025     Lab Results   Component Value Date    TRIG 125 08/05/2025     Lab Results    Component Value Date    CHOLHDL 24.2 08/05/2025     Lab Results   Component Value Date    HGBA1C 5.3 08/05/2025     BP Readings from Last 1 Encounters:   08/05/25 120/60       Food History  Breakfast:  2 cups oats + 2 scoops Greek yogurt + 1 scoop protein powder (25 gm PRO) + blueberries + 8 oz 1% milk  Mid-morning Snack:  That's It fruit bar  Lunch:  Deer, chicken, etc. + 1/2 avocado +/- rice  Mid-afternoon Snack:  Orgain protein bar + Element  Dinner:  Tuna steak + rice + vegetables/deer stew  Snack:  2 scoops Greek yogurt + 1 scoop protein powder  *Fluid intake:  [employer], 1% milk, water, coffee, Element    Exercise History:  Runs 10-30 minutes 3x/week + soccer 1x/week + resistance training 3x/week    Cultural/Spiritual/Personal Preferences:  None identified    Support System:  friends and family    State of Change:  Contemplation    Barriers to Change:  None    Diagnosis    Altered nutrition related laboratory values related to increased protein intake as evidenced by BUN:  22.    Intervention    RMR (Method:  InBody):  1989 kcal  Activity Factor:  1.4    CHANTEL:  2784 - 250 = 2534 kcal    Goals:  1.  BUN:  WNL  2.  Reduce protein intake to <160 gm/day (1.8 gm protein/kg body weight), distributed evenly among meals and snacks  3.  Consider eliminating HS snack  4.  Aim for ~100 fluid oz daily  5.  Maintain active lifestyle as tolerated  6.  Achieve personal goal weight of 190#  7.  Consider reducing ETOH intake on weekends x 25%  8.  Adhere to USDA Plate Method when planning meals:  1/2 plate non-starchy vegetables + 1/4 plate PRO + 1/4 plate CHO    Nutrition Education  The following education was provided to the patient:  Complimented patient on proactive role in health maintenance.  Complimented patient on physical activity efforts.  Discussed meal planning/USDA's My Plate design.  Discussed healthy snacking.   Discussed weight management.  Suggested dietary modifications based on current dietary behaviors  and individual food preferences.  Discussed macronutrient distribution among meals and snacks, including CHO, protein, and fat recommendations and its importance/benefits.  Discussed physical activity guidelines and its associated benefits.  Discussed nutrition-related lab values and dietary and/or lifestyle factors affecting them.  Discussed recommended protein intake (may include but not limited to recommended food sources, benefits of adequate protein intake, importance of protein distribution, etc.)   Discussed alcohol consumption (potential health consequences of excessive alcohol intake, recommended intake for men and women, recommended serving sizes, ways to reduce and/or moderate intake, etc.).  Discussed recommended servings of non-starchy vegetables/day and food sources of such.  Discussed benefits of adequate hydration and recommended fluid intake.  Discussed goal setting.    Patient verbalized understanding of nutrition education and recommendations received.    Handouts Provided  Meal Planning Guide  Eat Fit Shopping List  Fueling Well On-The-Go    Monitoring/Evaluation    Monitor the following:  Weight  BMI  % Body Fat  Caloric intake  CMP    Follow Up Plan:  Communication with referring healthcare provider is unnecessary at this time as patient presented as part of annual wellness exam.  However, will follow up with patient in 1-2 years.

## 2025-08-05 NOTE — PROGRESS NOTES
Subjective:       Patient ID: Will Maldonado is a 30 y.o. male.    Chief Complaint: Annual Exam    Patient in for [employer] physical.  He has a number of issues to review today.  Generally he is feeling okay but he has some problems with his right great toe, concerns about hair loss, lab review, poison ivy and wanted to discuss HPV.  Generally healthy male, previously , , has had some girlfriends.  Most recently he says his girlfriend told him she was diagnosed with HPV on a Pap smear.  Patient states he has no lesions or sores.  No previous knowledge of this with his wife for other female partners.  He has no history of anal sex or relations with men so does not consider himself at increased risk.    He had 3 HPV doses when he was younger and has had no other concerns.  We reviewed that he may not have any obvious or active lesions and this could have been something short-lived for him.  He said the girlfriend mentioned her findings were thought to be mild and low-grade and should resolve on their own according to her gynecologist.    Patient injured his right great toe about 4 weeks ago.  Did not get it addressed initially but there was bruising, pain, tenderness.  It has improved significantly except for dorsiflexion that he was thinking about playing soccer again and wanted to know my thoughts.  There is still some tenderness so I suggested an x-ray before he returns to that activity    He has some small patches a poison ivy on the left side of the neck in the left forearm so we will evaluate and recommend over-the-counter hydrocortisone.  EKG was normal   Labs show mild hyperlipidemia, slight bump in BUN and creatinine but he exercises a lot and takes creatine.  We will monitor this and he will keep hydrated.  Other labs were stable.    No change in family history      Review of Systems   Constitutional:  Negative for chills, fatigue and fever.   HENT:  Negative for nosebleeds and  trouble swallowing.    Eyes:  Negative for pain and visual disturbance.   Respiratory:  Negative for cough, shortness of breath and wheezing.    Cardiovascular:  Negative for chest pain and palpitations.   Gastrointestinal:  Negative for abdominal pain, constipation, diarrhea, nausea and vomiting.   Genitourinary:  Negative for difficulty urinating and hematuria.   Musculoskeletal:  Positive for arthralgias (right great toe). Negative for back pain and neck pain.   Integumentary:  Positive for rash (neck and left forearm).   Neurological:  Negative for dizziness and headaches.   Hematological:  Does not bruise/bleed easily.   Psychiatric/Behavioral:  Negative for dysphoric mood and sleep disturbance.            History reviewed. No pertinent past medical history.  Past Surgical History:   Procedure Laterality Date    lipoma removal  01/2021    right forehead      Problem List[1]     Objective:      Physical Exam  Constitutional:       General: He is not in acute distress.     Appearance: He is well-developed.   HENT:      Head: Normocephalic and atraumatic.      Right Ear: Tympanic membrane, ear canal and external ear normal.      Left Ear: Tympanic membrane, ear canal and external ear normal.      Mouth/Throat:      Pharynx: No oropharyngeal exudate or posterior oropharyngeal erythema.   Eyes:      General: No scleral icterus.     Conjunctiva/sclera: Conjunctivae normal.      Pupils: Pupils are equal, round, and reactive to light.   Neck:      Thyroid: No thyromegaly.      Comments: No supraclavicular nodes palpated  Cardiovascular:      Rate and Rhythm: Normal rate and regular rhythm.      Pulses: Normal pulses.      Heart sounds: Normal heart sounds. No murmur heard.  Pulmonary:      Effort: Pulmonary effort is normal.      Breath sounds: Normal breath sounds. No wheezing.   Abdominal:      General: Bowel sounds are normal.      Palpations: Abdomen is soft. There is no mass.      Tenderness: There is no abdominal  tenderness.   Genitourinary:     Penis: Normal.       Comments: No penile or scrotal lesions.  No lumps skin lesions or genital warts that I could identify  Musculoskeletal:         General: Tenderness (mild tenderness right great toe with dorsiflexion) present.      Cervical back: Normal range of motion and neck supple.      Right lower leg: No edema.      Left lower leg: No edema.   Lymphadenopathy:      Cervical: No cervical adenopathy.   Skin:     Coloration: Skin is not jaundiced or pale.      Findings: Rash (for small patches on the left side of the neck and 1 on the forearm possible drying poison ivy) present.   Neurological:      General: No focal deficit present.      Mental Status: He is alert and oriented to person, place, and time.   Psychiatric:         Mood and Affect: Mood normal.         Behavior: Behavior normal.         Assessment:       Problem List Items Addressed This Visit          Endocrine    Subclinical hypothyroidism     Other Visit Diagnoses         Routine physical examination    -  Primary      Hair loss          Hyperlipidemia, unspecified hyperlipidemia type        Mild, chol 211      Chronic low back pain without sciatica, unspecified back pain laterality          Poison ivy        neck/left forearm      Sprain of interphalangeal joint of right great toe, initial encounter        Relevant Orders    X-Ray Foot Complete Right (Completed)      Great toe pain, right        Relevant Orders    X-Ray Foot Complete Right (Completed)            Plan:         Will was seen today for annual exam.    Diagnoses and all orders for this visit:    Routine physical examination    Subclinical hypothyroidism  Continue to monitor thyroid  Hair loss  minoxidil  Hyperlipidemia, unspecified hyperlipidemia type  Comments:  Mild, chol 211  Continue to monitor  Chronic low back pain without sciatica, unspecified back pain laterality  Continue PT  Poison ivy  Comments:  neck/left forearm  Over-the-counter 1%  "hydrocortisone  Sprain of interphalangeal joint of right great toe, initial encounter  -     X-Ray Foot Complete Right; Future    Great toe pain, right  -     X-Ray Foot Complete Right; Future       No additional screening needed for possible HPV exposure at this time-monitor for any problems with throat or groin.      Follow-up annually    Component      Latest Ref Rng 8/5/2025   Sodium      136 - 145 mmol/L 139    Potassium      3.5 - 5.1 mmol/L 4.2    Chloride      95 - 110 mmol/L 102    CO2      23 - 29 mmol/L 29    Glucose      70 - 110 mg/dL 91    BUN      6 - 20 mg/dL 22 (H)    Creatinine      0.5 - 1.4 mg/dL 1.4    Calcium      8.7 - 10.5 mg/dL 9.3    PROTEIN TOTAL      6.0 - 8.4 gm/dL 7.5    Albumin      3.5 - 5.2 g/dL 4.6    BILIRUBIN TOTAL      0.1 - 1.0 mg/dL 0.8    ALP      40 - 150 unit/L 63    AST      0 - 50 unit/L 39    ALT      0 - 55 unit/L 42    Anion Gap      8 - 16 mmol/L 8    eGFR      >60 mL/min/1.73/m2 >60    WBC      3.90 - 12.70 K/uL 4.80    RBC      4.60 - 6.20 M/uL 4.93    Hemoglobin      14.0 - 18.0 gm/dL 14.9    Hematocrit      40.0 - 54.0 % 45.0    MCV      82 - 98 fL 91    MCHC      32.0 - 36.0 g/dL 33.1    RDW      11.5 - 14.5 % 12.1    Platelet Count      150 - 450 K/uL 239    MCH      27.0 - 31.0 pg 30.2    MPV      9.2 - 12.9 fL 10.1    Cholesterol Total      120 - 199 mg/dL 211 (H)    Triglycerides      30 - 150 mg/dL 125    HDL      40 - 75 mg/dL 51    LDL Cholesterol      63.0 - 159.0 mg/dL 135.0    HDL/Cholesterol Ratio      20.0 - 50.0 % 24.2    Total Cholesterol/HDL Ratio      2.0 - 5.0  4.1    Non-HDL Cholesterol      mg/dL 160    Hemoglobin A1C External      4.0 - 5.6 % 5.3    Estimated Avg Glucose      68 - 131 mg/dL 105    TSH      0.400 - 4.000 uIU/mL 2.498       No fracture on foot x-ray    Portions of this note may have been created with voice recognition software. Occasional "wrong-word" or "sound-a-like" substitutions may have occurred due to the inherent " limitations of voice recognition software. Please, read the note carefully and recognize, using context, where substitutions have occurred.         [1]   Patient Active Problem List  Diagnosis    Environmental allergies    BMI 26.0-26.9,adult    BMI 24.0-24.9, adult    Sebaceous cyst    History of anal fissures    Subclinical hypothyroidism    Anxiety